# Patient Record
Sex: MALE | Race: OTHER | HISPANIC OR LATINO | URBAN - METROPOLITAN AREA
[De-identification: names, ages, dates, MRNs, and addresses within clinical notes are randomized per-mention and may not be internally consistent; named-entity substitution may affect disease eponyms.]

---

## 2019-07-10 ENCOUNTER — OUTPATIENT (OUTPATIENT)
Dept: OUTPATIENT SERVICES | Facility: HOSPITAL | Age: 59
LOS: 1 days | Discharge: HOME | End: 2019-07-10
Payer: MEDICAID

## 2019-07-10 DIAGNOSIS — M25.562 PAIN IN LEFT KNEE: ICD-10-CM

## 2019-07-10 PROCEDURE — 73562 X-RAY EXAM OF KNEE 3: CPT | Mod: 26,LT

## 2020-02-13 PROBLEM — Z00.00 ENCOUNTER FOR PREVENTIVE HEALTH EXAMINATION: Status: ACTIVE | Noted: 2020-02-13

## 2020-02-20 ENCOUNTER — APPOINTMENT (OUTPATIENT)
Dept: UROLOGY | Facility: CLINIC | Age: 60
End: 2020-02-20
Payer: MEDICAID

## 2020-02-20 DIAGNOSIS — N13.30 UNSPECIFIED HYDRONEPHROSIS: ICD-10-CM

## 2020-02-20 PROCEDURE — 99202 OFFICE O/P NEW SF 15 MIN: CPT

## 2020-02-20 NOTE — ASSESSMENT
[FreeTextEntry1] : 60 yo with bladder mass / cancer\par hydronephrosis\par \par - CT urogram\par - cystoscopy / TURBT\par \par full R+B explained\par all options provided\par

## 2020-02-20 NOTE — HISTORY OF PRESENT ILLNESS
[None] : no symptoms [FreeTextEntry1] : 60 yo with newly diagnosed bladder cancer\par \par s/p left nephrectomy for non-functional kidney \par \par found to have a bladder tumor on office US\par

## 2020-02-20 NOTE — LETTER BODY
[Dear  ___] : Dear  [unfilled], [Consult Letter:] : I had the pleasure of evaluating your patient, [unfilled]. [Please see my note below.] : Please see my note below. [Sincerely,] : Sincerely, [FreeTextEntry3] : Chet Diaz MD, FACS\par

## 2020-02-27 ENCOUNTER — OUTPATIENT (OUTPATIENT)
Dept: OUTPATIENT SERVICES | Facility: HOSPITAL | Age: 60
LOS: 1 days | Discharge: HOME | End: 2020-02-27

## 2020-02-27 DIAGNOSIS — C67.9 MALIGNANT NEOPLASM OF BLADDER, UNSPECIFIED: ICD-10-CM

## 2020-02-27 DIAGNOSIS — N13.30 UNSPECIFIED HYDRONEPHROSIS: ICD-10-CM

## 2020-02-28 ENCOUNTER — OUTPATIENT (OUTPATIENT)
Dept: OUTPATIENT SERVICES | Facility: HOSPITAL | Age: 60
LOS: 1 days | Discharge: HOME | End: 2020-02-28
Payer: COMMERCIAL

## 2020-02-28 DIAGNOSIS — N13.30 UNSPECIFIED HYDRONEPHROSIS: ICD-10-CM

## 2020-02-28 PROCEDURE — 74178 CT ABD&PLV WO CNTR FLWD CNTR: CPT | Mod: 26

## 2020-03-05 ENCOUNTER — APPOINTMENT (OUTPATIENT)
Dept: UROLOGY | Facility: CLINIC | Age: 60
End: 2020-03-05
Payer: MEDICAID

## 2020-03-05 DIAGNOSIS — N13.30 UNSPECIFIED HYDRONEPHROSIS: ICD-10-CM

## 2020-03-05 PROCEDURE — 99214 OFFICE O/P EST MOD 30 MIN: CPT

## 2020-03-06 PROBLEM — N13.30 HYDRONEPHROSIS: Status: ACTIVE | Noted: 2020-03-06

## 2020-03-06 NOTE — ASSESSMENT
[Hydronephrosis (591\N13.30)] : Salter-Braden type I [FreeTextEntry1] : 60 yo with solitary right kidney\par question of hydronephrosis with transition at proximal ureter - no filling defect\par small bladder lesion\par no evidence of extravesical extension \par \par I discussed the surgery in detail\par I explained the role of post resection Mitomycin C\par I explained the possibility that he may require post operative catheterization\par due  either to concern for retention or if the resection was performed into the deeper segment of the bladder to prevent perforation\par all questions answered\par

## 2020-03-06 NOTE — HISTORY OF PRESENT ILLNESS
[None] : no symptoms [FreeTextEntry1] : 60 yo with newly diagnosed bladder cancer\par \par s/p left nephrectomy for non-functional kidney - [facility]\par \par found to have a bladder tumor on office US here to review CT scan and discuss surgery\par \par CT scan 2/28/20\par IMPRESSION: \par 1. Irregular lobulated intraluminal soft tissue mass arising from the left \par posteriolateral urinary bladder wall measuring up to 1.2 cm. Recommend \par correlation with cystoscopy. \par 2. Mild-to-moderate right hydronephrosis without obstructing  tract \par calculus or mass lesion. Abrupt change in caliber within the proximal \par portion of the right ureter suspicious for possible stricture. \par 3. Left nephrectomy. \par \par \par

## 2020-03-09 ENCOUNTER — OUTPATIENT (OUTPATIENT)
Dept: OUTPATIENT SERVICES | Facility: HOSPITAL | Age: 60
LOS: 1 days | Discharge: HOME | End: 2020-03-09
Payer: MEDICAID

## 2020-03-09 VITALS
HEART RATE: 76 BPM | TEMPERATURE: 97 F | HEIGHT: 63 IN | SYSTOLIC BLOOD PRESSURE: 118 MMHG | WEIGHT: 149.91 LBS | OXYGEN SATURATION: 99 % | RESPIRATION RATE: 16 BRPM | DIASTOLIC BLOOD PRESSURE: 68 MMHG

## 2020-03-09 DIAGNOSIS — Z90.5 ACQUIRED ABSENCE OF KIDNEY: Chronic | ICD-10-CM

## 2020-03-09 DIAGNOSIS — Z01.818 ENCOUNTER FOR OTHER PREPROCEDURAL EXAMINATION: ICD-10-CM

## 2020-03-09 DIAGNOSIS — C67.9 MALIGNANT NEOPLASM OF BLADDER, UNSPECIFIED: ICD-10-CM

## 2020-03-09 DIAGNOSIS — I26.99 OTHER PULMONARY EMBOLISM WITHOUT ACUTE COR PULMONALE: ICD-10-CM

## 2020-03-09 LAB
ALBUMIN SERPL ELPH-MCNC: 4.3 G/DL — SIGNIFICANT CHANGE UP (ref 3.5–5.2)
ALP SERPL-CCNC: 69 U/L — SIGNIFICANT CHANGE UP (ref 30–115)
ALT FLD-CCNC: 15 U/L — SIGNIFICANT CHANGE UP (ref 0–41)
ANION GAP SERPL CALC-SCNC: 11 MMOL/L — SIGNIFICANT CHANGE UP (ref 7–14)
APPEARANCE UR: CLEAR — SIGNIFICANT CHANGE UP
APTT BLD: 34.3 SEC — SIGNIFICANT CHANGE UP (ref 27–39.2)
AST SERPL-CCNC: 23 U/L — SIGNIFICANT CHANGE UP (ref 0–41)
BASOPHILS # BLD AUTO: 0.01 K/UL — SIGNIFICANT CHANGE UP (ref 0–0.2)
BASOPHILS NFR BLD AUTO: 0.2 % — SIGNIFICANT CHANGE UP (ref 0–1)
BILIRUB SERPL-MCNC: 0.5 MG/DL — SIGNIFICANT CHANGE UP (ref 0.2–1.2)
BILIRUB UR-MCNC: NEGATIVE — SIGNIFICANT CHANGE UP
BUN SERPL-MCNC: 13 MG/DL — SIGNIFICANT CHANGE UP (ref 10–20)
CALCIUM SERPL-MCNC: 9 MG/DL — SIGNIFICANT CHANGE UP (ref 8.5–10.1)
CHLORIDE SERPL-SCNC: 103 MMOL/L — SIGNIFICANT CHANGE UP (ref 98–110)
CO2 SERPL-SCNC: 26 MMOL/L — SIGNIFICANT CHANGE UP (ref 17–32)
COLOR SPEC: SIGNIFICANT CHANGE UP
CREAT SERPL-MCNC: 1 MG/DL — SIGNIFICANT CHANGE UP (ref 0.7–1.5)
DIFF PNL FLD: NEGATIVE — SIGNIFICANT CHANGE UP
EOSINOPHIL # BLD AUTO: 0.15 K/UL — SIGNIFICANT CHANGE UP (ref 0–0.7)
EOSINOPHIL NFR BLD AUTO: 2.6 % — SIGNIFICANT CHANGE UP (ref 0–8)
GLUCOSE SERPL-MCNC: 80 MG/DL — SIGNIFICANT CHANGE UP (ref 70–99)
GLUCOSE UR QL: NEGATIVE — SIGNIFICANT CHANGE UP
HCT VFR BLD CALC: 45.3 % — SIGNIFICANT CHANGE UP (ref 42–52)
HGB BLD-MCNC: 15.4 G/DL — SIGNIFICANT CHANGE UP (ref 14–18)
IMM GRANULOCYTES NFR BLD AUTO: 0.3 % — SIGNIFICANT CHANGE UP (ref 0.1–0.3)
INR BLD: 0.96 RATIO — SIGNIFICANT CHANGE UP (ref 0.65–1.3)
KETONES UR-MCNC: NEGATIVE — SIGNIFICANT CHANGE UP
LEUKOCYTE ESTERASE UR-ACNC: NEGATIVE — SIGNIFICANT CHANGE UP
LYMPHOCYTES # BLD AUTO: 1.49 K/UL — SIGNIFICANT CHANGE UP (ref 1.2–3.4)
LYMPHOCYTES # BLD AUTO: 25.5 % — SIGNIFICANT CHANGE UP (ref 20.5–51.1)
MCHC RBC-ENTMCNC: 30.3 PG — SIGNIFICANT CHANGE UP (ref 27–31)
MCHC RBC-ENTMCNC: 34 G/DL — SIGNIFICANT CHANGE UP (ref 32–37)
MCV RBC AUTO: 89 FL — SIGNIFICANT CHANGE UP (ref 80–94)
MONOCYTES # BLD AUTO: 0.55 K/UL — SIGNIFICANT CHANGE UP (ref 0.1–0.6)
MONOCYTES NFR BLD AUTO: 9.4 % — HIGH (ref 1.7–9.3)
NEUTROPHILS # BLD AUTO: 3.63 K/UL — SIGNIFICANT CHANGE UP (ref 1.4–6.5)
NEUTROPHILS NFR BLD AUTO: 62 % — SIGNIFICANT CHANGE UP (ref 42.2–75.2)
NITRITE UR-MCNC: NEGATIVE — SIGNIFICANT CHANGE UP
NRBC # BLD: 0 /100 WBCS — SIGNIFICANT CHANGE UP (ref 0–0)
PH UR: 6.5 — SIGNIFICANT CHANGE UP (ref 5–8)
PLATELET # BLD AUTO: 238 K/UL — SIGNIFICANT CHANGE UP (ref 130–400)
POTASSIUM SERPL-MCNC: 4 MMOL/L — SIGNIFICANT CHANGE UP (ref 3.5–5)
POTASSIUM SERPL-SCNC: 4 MMOL/L — SIGNIFICANT CHANGE UP (ref 3.5–5)
PROT SERPL-MCNC: 7 G/DL — SIGNIFICANT CHANGE UP (ref 6–8)
PROT UR-MCNC: NEGATIVE — SIGNIFICANT CHANGE UP
PROTHROM AB SERPL-ACNC: 11 SEC — SIGNIFICANT CHANGE UP (ref 9.95–12.87)
RBC # BLD: 5.09 M/UL — SIGNIFICANT CHANGE UP (ref 4.7–6.1)
RBC # FLD: 12.3 % — SIGNIFICANT CHANGE UP (ref 11.5–14.5)
SODIUM SERPL-SCNC: 140 MMOL/L — SIGNIFICANT CHANGE UP (ref 135–146)
SP GR SPEC: 1.01 — SIGNIFICANT CHANGE UP (ref 1.01–1.02)
UROBILINOGEN FLD QL: SIGNIFICANT CHANGE UP
WBC # BLD: 5.85 K/UL — SIGNIFICANT CHANGE UP (ref 4.8–10.8)
WBC # FLD AUTO: 5.85 K/UL — SIGNIFICANT CHANGE UP (ref 4.8–10.8)

## 2020-03-09 PROCEDURE — 71046 X-RAY EXAM CHEST 2 VIEWS: CPT | Mod: 26

## 2020-03-09 PROCEDURE — 93010 ELECTROCARDIOGRAM REPORT: CPT

## 2020-03-09 NOTE — H&P PST ADULT - REASON FOR ADMISSION
59 yr old man to past for cystoscopy bladder tumor resection sent by pmd to urology noted mass bladder per pt new dx no fever no recent travel no uti uri cp palp sob pain at this time exercise tolerance 2 flights liv[place] screen revd

## 2020-03-10 LAB
CULTURE RESULTS: SIGNIFICANT CHANGE UP
SPECIMEN SOURCE: SIGNIFICANT CHANGE UP

## 2020-03-25 ENCOUNTER — APPOINTMENT (OUTPATIENT)
Dept: UROLOGY | Facility: HOSPITAL | Age: 60
End: 2020-03-25

## 2020-04-03 PROBLEM — M19.90 UNSPECIFIED OSTEOARTHRITIS, UNSPECIFIED SITE: Chronic | Status: ACTIVE | Noted: 2020-03-09

## 2020-04-03 PROBLEM — D49.4 NEOPLASM OF UNSPECIFIED BEHAVIOR OF BLADDER: Chronic | Status: ACTIVE | Noted: 2020-03-09

## 2020-04-14 ENCOUNTER — OUTPATIENT (OUTPATIENT)
Dept: OUTPATIENT SERVICES | Facility: HOSPITAL | Age: 60
LOS: 1 days | Discharge: HOME | End: 2020-04-14
Payer: MEDICAID

## 2020-04-14 ENCOUNTER — INPATIENT (INPATIENT)
Facility: HOSPITAL | Age: 60
LOS: 2 days | Discharge: HOME | End: 2020-04-17
Attending: INTERNAL MEDICINE | Admitting: INTERNAL MEDICINE
Payer: COMMERCIAL

## 2020-04-14 VITALS
SYSTOLIC BLOOD PRESSURE: 91 MMHG | TEMPERATURE: 98 F | HEART RATE: 103 BPM | OXYGEN SATURATION: 90 % | RESPIRATION RATE: 26 BRPM | DIASTOLIC BLOOD PRESSURE: 60 MMHG

## 2020-04-14 DIAGNOSIS — Z90.5 ACQUIRED ABSENCE OF KIDNEY: Chronic | ICD-10-CM

## 2020-04-14 DIAGNOSIS — A41.89 OTHER SPECIFIED SEPSIS: ICD-10-CM

## 2020-04-14 DIAGNOSIS — R06.02 SHORTNESS OF BREATH: ICD-10-CM

## 2020-04-14 LAB
ALBUMIN SERPL ELPH-MCNC: 3 G/DL — LOW (ref 3.5–5.2)
ALP SERPL-CCNC: 66 U/L — SIGNIFICANT CHANGE UP (ref 30–115)
ALT FLD-CCNC: 51 U/L — HIGH (ref 0–41)
ANION GAP SERPL CALC-SCNC: 12 MMOL/L — SIGNIFICANT CHANGE UP (ref 7–14)
AST SERPL-CCNC: 44 U/L — HIGH (ref 0–41)
BASOPHILS # BLD AUTO: 0.02 K/UL — SIGNIFICANT CHANGE UP (ref 0–0.2)
BASOPHILS NFR BLD AUTO: 0.3 % — SIGNIFICANT CHANGE UP (ref 0–1)
BILIRUB SERPL-MCNC: 0.6 MG/DL — SIGNIFICANT CHANGE UP (ref 0.2–1.2)
BUN SERPL-MCNC: 12 MG/DL — SIGNIFICANT CHANGE UP (ref 10–20)
CALCIUM SERPL-MCNC: 8.5 MG/DL — SIGNIFICANT CHANGE UP (ref 8.5–10.1)
CHLORIDE SERPL-SCNC: 97 MMOL/L — LOW (ref 98–110)
CO2 SERPL-SCNC: 23 MMOL/L — SIGNIFICANT CHANGE UP (ref 17–32)
CREAT SERPL-MCNC: 0.7 MG/DL — SIGNIFICANT CHANGE UP (ref 0.7–1.5)
D DIMER BLD IA.RAPID-MCNC: 303 NG/ML DDU — HIGH (ref 0–230)
EOSINOPHIL # BLD AUTO: 0.04 K/UL — SIGNIFICANT CHANGE UP (ref 0–0.7)
EOSINOPHIL NFR BLD AUTO: 0.5 % — SIGNIFICANT CHANGE UP (ref 0–8)
GLUCOSE SERPL-MCNC: 127 MG/DL — HIGH (ref 70–99)
HCT VFR BLD CALC: 38.2 % — LOW (ref 42–52)
HGB BLD-MCNC: 14.1 G/DL — SIGNIFICANT CHANGE UP (ref 14–18)
IMM GRANULOCYTES NFR BLD AUTO: 1.6 % — HIGH (ref 0.1–0.3)
LYMPHOCYTES # BLD AUTO: 0.77 K/UL — LOW (ref 1.2–3.4)
LYMPHOCYTES # BLD AUTO: 10.4 % — LOW (ref 20.5–51.1)
MCHC RBC-ENTMCNC: 31.7 PG — HIGH (ref 27–31)
MCHC RBC-ENTMCNC: 36.9 G/DL — SIGNIFICANT CHANGE UP (ref 32–37)
MCV RBC AUTO: 85.8 FL — SIGNIFICANT CHANGE UP (ref 80–94)
MONOCYTES # BLD AUTO: 0.92 K/UL — HIGH (ref 0.1–0.6)
MONOCYTES NFR BLD AUTO: 12.4 % — HIGH (ref 1.7–9.3)
NEUTROPHILS # BLD AUTO: 5.52 K/UL — SIGNIFICANT CHANGE UP (ref 1.4–6.5)
NEUTROPHILS NFR BLD AUTO: 74.8 % — SIGNIFICANT CHANGE UP (ref 42.2–75.2)
NRBC # BLD: 0 /100 WBCS — SIGNIFICANT CHANGE UP (ref 0–0)
PLATELET # BLD AUTO: 410 K/UL — HIGH (ref 130–400)
POTASSIUM SERPL-MCNC: 3.9 MMOL/L — SIGNIFICANT CHANGE UP (ref 3.5–5)
POTASSIUM SERPL-SCNC: 3.9 MMOL/L — SIGNIFICANT CHANGE UP (ref 3.5–5)
PROT SERPL-MCNC: 6.2 G/DL — SIGNIFICANT CHANGE UP (ref 6–8)
RBC # BLD: 4.45 M/UL — LOW (ref 4.7–6.1)
RBC # FLD: 12.3 % — SIGNIFICANT CHANGE UP (ref 11.5–14.5)
SODIUM SERPL-SCNC: 132 MMOL/L — LOW (ref 135–146)
WBC # BLD: 7.39 K/UL — SIGNIFICANT CHANGE UP (ref 4.8–10.8)
WBC # FLD AUTO: 7.39 K/UL — SIGNIFICANT CHANGE UP (ref 4.8–10.8)

## 2020-04-14 PROCEDURE — 99285 EMERGENCY DEPT VISIT HI MDM: CPT

## 2020-04-14 PROCEDURE — 71046 X-RAY EXAM CHEST 2 VIEWS: CPT | Mod: 26

## 2020-04-14 PROCEDURE — 93010 ELECTROCARDIOGRAM REPORT: CPT

## 2020-04-14 RX ORDER — CIPROFLOXACIN LACTATE 400MG/40ML
1 VIAL (ML) INTRAVENOUS
Qty: 0 | Refills: 0 | DISCHARGE

## 2020-04-14 RX ORDER — SODIUM CHLORIDE 9 MG/ML
1000 INJECTION, SOLUTION INTRAVENOUS ONCE
Refills: 0 | Status: COMPLETED | OUTPATIENT
Start: 2020-04-14 | End: 2020-04-14

## 2020-04-14 RX ORDER — ZINC SULFATE TAB 220 MG (50 MG ZINC EQUIVALENT) 220 (50 ZN) MG
220 TAB ORAL DAILY
Refills: 0 | Status: DISCONTINUED | OUTPATIENT
Start: 2020-04-14 | End: 2020-04-17

## 2020-04-14 RX ORDER — ENOXAPARIN SODIUM 100 MG/ML
40 INJECTION SUBCUTANEOUS AT BEDTIME
Refills: 0 | Status: DISCONTINUED | OUTPATIENT
Start: 2020-04-14 | End: 2020-04-17

## 2020-04-14 RX ORDER — ALBUTEROL 90 UG/1
2 AEROSOL, METERED ORAL EVERY 6 HOURS
Refills: 0 | Status: DISCONTINUED | OUTPATIENT
Start: 2020-04-14 | End: 2020-04-17

## 2020-04-14 RX ORDER — HYDROXYCHLOROQUINE SULFATE 200 MG
800 TABLET ORAL EVERY 24 HOURS
Refills: 0 | Status: COMPLETED | OUTPATIENT
Start: 2020-04-14 | End: 2020-04-14

## 2020-04-14 RX ORDER — HYDROXYCHLOROQUINE SULFATE 200 MG
400 TABLET ORAL EVERY 24 HOURS
Refills: 0 | Status: DISCONTINUED | OUTPATIENT
Start: 2020-04-15 | End: 2020-04-17

## 2020-04-14 RX ORDER — SODIUM CHLORIDE 9 MG/ML
1000 INJECTION INTRAMUSCULAR; INTRAVENOUS; SUBCUTANEOUS
Refills: 0 | Status: DISCONTINUED | OUTPATIENT
Start: 2020-04-14 | End: 2020-04-14

## 2020-04-14 RX ORDER — HYDROXYCHLOROQUINE SULFATE 200 MG
TABLET ORAL
Refills: 0 | Status: DISCONTINUED | OUTPATIENT
Start: 2020-04-14 | End: 2020-04-17

## 2020-04-14 RX ORDER — GUAIFENESIN/DEXTROMETHORPHAN 600MG-30MG
5 TABLET, EXTENDED RELEASE 12 HR ORAL EVERY 6 HOURS
Refills: 0 | Status: DISCONTINUED | OUTPATIENT
Start: 2020-04-14 | End: 2020-04-17

## 2020-04-14 RX ORDER — ASCORBIC ACID 60 MG
1000 TABLET,CHEWABLE ORAL DAILY
Refills: 0 | Status: DISCONTINUED | OUTPATIENT
Start: 2020-04-14 | End: 2020-04-17

## 2020-04-14 RX ORDER — SODIUM CHLORIDE 9 MG/ML
1000 INJECTION INTRAMUSCULAR; INTRAVENOUS; SUBCUTANEOUS
Refills: 0 | Status: DISCONTINUED | OUTPATIENT
Start: 2020-04-14 | End: 2020-04-15

## 2020-04-14 RX ADMIN — Medication 800 MILLIGRAM(S): at 18:04

## 2020-04-14 RX ADMIN — SODIUM CHLORIDE 1000 MILLILITER(S): 9 INJECTION, SOLUTION INTRAVENOUS at 14:42

## 2020-04-14 RX ADMIN — Medication 1000 MILLIGRAM(S): at 19:07

## 2020-04-14 RX ADMIN — SODIUM CHLORIDE 75 MILLILITER(S): 9 INJECTION INTRAMUSCULAR; INTRAVENOUS; SUBCUTANEOUS at 18:04

## 2020-04-14 RX ADMIN — ZINC SULFATE TAB 220 MG (50 MG ZINC EQUIVALENT) 220 MILLIGRAM(S): 220 (50 ZN) TAB at 19:07

## 2020-04-14 RX ADMIN — ENOXAPARIN SODIUM 40 MILLIGRAM(S): 100 INJECTION SUBCUTANEOUS at 21:38

## 2020-04-14 NOTE — ED ADULT TRIAGE NOTE - CHIEF COMPLAINT QUOTE
sent to ED by PMD for abnormal chest x-ray results  "there is fluid on my lungs"   denies fever at home, states he has been on antibiotics

## 2020-04-14 NOTE — ED PROVIDER NOTE - ATTENDING CONTRIBUTION TO CARE
59M PMH L nephrectomy 2003 and bladder tumor not on treatment, plans for resection, p/w 2 weeks of dry cough and sore throat since 3/20. seen by pmd and given oral antiboitics which didn't help, hasn't completed yet. states today had CXR and told he has fluid in lungs so told to come to ED. states he has had grad worsening SOB since 4/1, worse w exertion. no cp. no recent travel, sick contacts.     on exam, AFVSS, well barbara nad, ncat, eomi, perrla, mmm, lctab, rrr nl s1s2 no mrg, abd soft ntnd, aaox3, no focal deficits, no le edema or calf ttp,     RA sat at rest is 93-94%, with ambulation in room 90% RA, no resp distress  cxr reviewed from today outpt with bilat patch interstitial opacities    a/p; cocern for covid w mild hypoxia, will do labs, ekg, admit for o2/monitoring

## 2020-04-14 NOTE — ED PROVIDER NOTE - OBJECTIVE STATEMENT
59 y/m with PMH of Left nephrectomy (for non functioning Kidney), Recently diagnosed bladder tumor (being planned for resection) presented with cough for 2 weeks and shortness of breath for 1 day. He initially had sore throat around 2 weeks back, and cough with minimal phlegm, for which he went to see his PMH who prescribed him with antibiotics Ciprofloxacin. However since yesterday he started having difficulty breathing. So he called his PMD, who did a CXR , and was recommended to go to ED "for fluid in lungs". He denies having any diarrhea, has increased urinary frequency since last 2 weeks. He denies any chest pain, fever. He denies any sick contacts or travel history.

## 2020-04-14 NOTE — H&P ADULT - ASSESSMENT
Patient is 60 y/o male with medical hx of Osteoarthritis, Recent diagnosis of bladder tumor (being planned for resection) presented from home with CC of URI symptoms for 2 weeks and New onset Dyspnea since yesterday    Viral Pneumonia, Multifocal  R/O COVID-19  -Mild sepsis on Presentation: hypotension and Tachycardia  -CXR: Bilateral/multifocal Patchy airspace opacity  -Saturating 94% ar rest >>> 90% with Ambulation   -Labs significant for Lymphopenia and Mild Transaminitis  -Denies Sick/Known covid Contacts ???  -Will Start Plaquenil given Hypoxia and CXR changes  -EP consult placed for QTC monitoring, Baseline QTC: 3/9: 395  -Supportive care with IVF, Vitamin C and Zinc   - f/u COVID19 swab, procalcitonin, CRP, ferritin,   - Tylenol PRN for fevers, Trend CBC and LFTs.  -  ID consult if no improvement     Bladder Tumor  -Recently diagnosed on CT Scan  from Feb 20  -Scheduled for Resection   -O/P F/up with Dr Diaz    H/O Osteoarthritis      DVT PPX: Lovenox  GI PPX: not indicated   Full Code  Dispo: from Home  -Pending Clinical improvement (hypoxia) Patient is 58 y/o male with medical hx of Osteoarthritis, Recent diagnosis of bladder tumor (being planned for resection) presented from home with CC of URI symptoms for 2 weeks and New onset Dyspnea since yesterday    Viral Pneumonia, Multifocal  R/O COVID-19  -Mild sepsis on Presentation: hypotension and Tachycardia  -CXR: Bilateral/multifocal Patchy airspace opacity  -Saturating 94% ar rest >>> 90% with Ambulation   -Labs significant for Lymphopenia and Mild Transaminitis  -Denies Sick/Known covid Contacts ???  -Will Start Plaquenil given Hypoxia and CXR changes  -EP consult placed for QTC monitoring, Baseline QTC: 4/14: 428  -Supportive care with IVF, Vitamin C and Zinc   - f/u COVID19 swab, procalcitonin, CRP, ferritin,   - Tylenol PRN for fevers, Trend CBC and LFTs.  -  ID consult if no improvement     Bladder Tumor  -Recently diagnosed on CT Scan  from Feb 20  -Scheduled for Resection   -O/P F/up with Dr Diaz    H/O Osteoarthritis      DVT PPX: Lovenox  GI PPX: not indicated   Full Code  Dispo: from Home  -Pending Clinical improvement (hypoxia) Patient is 60 y/o male with medical hx of Osteoarthritis, Recent diagnosis of bladder tumor (being planned for resection) presented from home with CC of URI symptoms for 2 weeks and New onset Dyspnea since yesterday    Viral Pneumonia, Multifocal  R/O COVID-19  -Mild sepsis on Presentation: hypotension and Tachycardia  -CXR: Bilateral/multifocal Patchy airspace opacity  -Saturating 94% ar rest >>> 90% with Ambulation   -Labs significant for Lymphopenia and Mild Transaminitis  -Denies Sick/Known covid Contacts ???  -Will Start Plaquenil given Hypoxia and CXR changes  -EP consult placed for QTC monitoring, Baseline QTC: 4/14: 428  -Supportive care with IVF, Vitamin C and Zinc   - f/u COVID19 swab, procalcitonin, CRP, ferritin,   - Tylenol PRN for fevers, Trend CBC and LFTs.  -  ID consult if no improvement     Bladder Tumor  -Recently diagnosed on CT Scan  from Feb 20  -Scheduled for Resection   -O/P F/up with Dr Diaz    H/O Osteoarthritis  -Not on Meds       DVT PPX: Lovenox  GI PPX: not indicated   Full Code  Dispo: from Home  -Pending Clinical improvement (hypoxia)

## 2020-04-14 NOTE — H&P ADULT - NSHPLABSRESULTS_GEN_ALL_CORE
LABS:                          14.1   7.39  )-----------( 410      ( 14 Apr 2020 13:55 )             38.2     04-14    132<L>  |  97<L>  |  12  ----------------------------<  127<H>  3.9   |  23  |  0.7    Ca    8.5      14 Apr 2020 13:55    TPro  6.2  /  Alb  3.0<L>  /  TBili  0.6  /  DBili  x   /  AST  44<H>  /  ALT  51<H>  /  AlkPhos  66  04-14          RADIOLOGY:      < from: Xray Chest 2 Views PA/Lat (04.14.20 @ 09:09) >    Impression:     New patchy bilateral airspace opacities, compatible with multifocal pneumonia -considerations include atypical viral etiology such as COVID-19. Radiographic follow-up recommended.    Spoke with GREGORY SMITH MD   on 4/14/2020 9:27 AM with readback.    EKGS:

## 2020-04-14 NOTE — ED PROVIDER NOTE - PROGRESS NOTE DETAILS
CXR done this AM shows Bilateral patchy opacity likely viral infection, COVID swab sent.  CBC, CMP, ferritin, D dimer, procal, crp sent  Will reassess vitals and further decision. Currently he is saturating 93-94% on room air but desaturates to 90% on minimal exertion.  His BP is borderline 91/60, will give him a bolus of 1 liter. Currently he is saturating 93-94% on room air but desaturates to 90% on minimal exertion.  His BP is borderline 91/60, will give him a bolus of 1 liter. Repeat BP after bolus is 140/90. Will admit for hypoxia

## 2020-04-14 NOTE — H&P ADULT - NSHPREVIEWOFSYSTEMS_GEN_ALL_CORE
CONSTITUTIONAL: No weakness, fevers or chills    RESPIRATORY: Endorses  cough and  shortness of breath    CARDIOVASCULAR: No chest pain or palpitations    GASTROINTESTINAL: No abdominal pain. No nausea, vomiting, No diarrhea    GENITOURINARY: Endorses frequency, No dysuria or hematuria    NEUROLOGICAL: No numbness or weakness

## 2020-04-14 NOTE — ED ADULT NURSE NOTE - OBJECTIVE STATEMENT
pt states has sob for 2 weeks . pt states "my pmd told me to come here because of my chest x ray" " I had fever once 2 weeks ago"

## 2020-04-14 NOTE — ED PROVIDER NOTE - NS ED ROS FT
REVIEW OF SYSTEMS:    CONSTITUTIONAL: Generalized weakness  EYES/ENT: No visual changes;  No vertigo or throat pain   NECK: No pain or stiffness  RESPIRATORY: Cough +, Shortness of breath +  CARDIOVASCULAR: No chest pain or palpitations  GASTROINTESTINAL: No abdominal or epigastric pain. No nausea, vomiting, or hematemesis; No diarrhea or constipation. No melena or hematochezia.  GENITOURINARY: Increased frequency  NEUROLOGICAL: No numbness or weakness  SKIN: No itching, rashes

## 2020-04-14 NOTE — ED PROVIDER NOTE - PHYSICAL EXAMINATION
T(C): 36.5 (04-14-20 @ 12:54), Max: 36.5 (04-14-20 @ 12:54)  HR: 103 (04-14-20 @ 12:54) (103 - 103)  BP: 91/60 (04-14-20 @ 12:54) (91/60 - 91/60)  RR: 26 (04-14-20 @ 12:54) (26 - 26)  SpO2: 90% (04-14-20 @ 12:54) (90% - 90%)  PHYSICAL EXAM:    Constitutional: Lying in bed no acute distress  Eyes: PERLAA  ENMT: No facial deviation  Neck: No mass/ JVD  Respiratory: B/l clear, no crackles, wheeze  Cardiovascular: Regular rate and rhythm, no murmur  Gastointestinal: soft non tender, no organomegaly  Extremities: No edema, B/l lower ext  Neurological: AO x 3, non focal

## 2020-04-14 NOTE — H&P ADULT - HISTORY OF PRESENT ILLNESS
Patient is 60 y/o male with medical hx of Osteoarthritis, Recent diagnosis of bladder tumor (being planned for resection) presented from home with CC of URI symptoms for 2 weeks and New onset Dyspnea since yesterday.  Symptoms initially started 2 weeks with sore throat and nonproductive cough, his PMD prescribed a course of Ciprofloxacin which he completed. However, since yesterday he started having difficulty breathing, So he  went to his PMD, CXR was done and he was  to go to ED for evaluation of " fluid in his lungs".     He denies CP, Palpitation Headaches, dizziness, Abdominal pain, N/V/D, diarrhea, Leg pain/swelling, no recent travel, no known sick or COVID contacts, he endorses a 2 week hx of urinary frequency, no foul smelling urine, no dysuria no hematuria    IN ED: Saturating 94% on RA with desaturation to 90% with Mild Exertion   Vitals: Borderline BP: 901/60, HR: 103, RR: 26  Labs Significant for Lymphopenia and Mild Transaminitis  CXR: Bilateral/multifocal  Patchy airspace opacity

## 2020-04-14 NOTE — ED ADULT NURSE NOTE - NSIMPLEMENTINTERV_GEN_ALL_ED
Implemented All Universal Safety Interventions:  [place] to call system. Call bell, personal items and telephone within reach. Instruct patient to call for assistance. Room bathroom lighting operational. Non-slip footwear when patient is off stretcher. Physically safe environment: no spills, clutter or unnecessary equipment. Stretcher in lowest position, wheels locked, appropriate side rails in place.

## 2020-04-14 NOTE — H&P ADULT - ATTENDING COMMENTS
Patient seen and examined independently. Agree with resident note with exceptions. Case discussed with housestaff, nursing and patient    Procalcitonin, Serum: 0.09 (04-14)    C-Reactive Protein, Serum: 19.98 (04-14)    Ferritin, Serum: 1216 (04-14)    D-Dimer Assay, Quantitative: 303 (04-14)    A/P  Suspected COVID PNA  - Pt clinically doing well but inflam markers markedly elevated, concerning for cytokine storm risk    Bladder mass  -outpt f/u

## 2020-04-15 DIAGNOSIS — R68.89 OTHER GENERAL SYMPTOMS AND SIGNS: ICD-10-CM

## 2020-04-15 DIAGNOSIS — Z02.9 ENCOUNTER FOR ADMINISTRATIVE EXAMINATIONS, UNSPECIFIED: ICD-10-CM

## 2020-04-15 DIAGNOSIS — R09.02 HYPOXEMIA: ICD-10-CM

## 2020-04-15 LAB
ALBUMIN SERPL ELPH-MCNC: 2.8 G/DL — LOW (ref 3.5–5.2)
ALP SERPL-CCNC: 68 U/L — SIGNIFICANT CHANGE UP (ref 30–115)
ALT FLD-CCNC: 41 U/L — SIGNIFICANT CHANGE UP (ref 0–41)
ANION GAP SERPL CALC-SCNC: 14 MMOL/L — SIGNIFICANT CHANGE UP (ref 7–14)
AST SERPL-CCNC: 34 U/L — SIGNIFICANT CHANGE UP (ref 0–41)
BASOPHILS # BLD AUTO: 0.02 K/UL — SIGNIFICANT CHANGE UP (ref 0–0.2)
BASOPHILS NFR BLD AUTO: 0.3 % — SIGNIFICANT CHANGE UP (ref 0–1)
BILIRUB SERPL-MCNC: 0.9 MG/DL — SIGNIFICANT CHANGE UP (ref 0.2–1.2)
BUN SERPL-MCNC: 9 MG/DL — LOW (ref 10–20)
CALCIUM SERPL-MCNC: 8.4 MG/DL — LOW (ref 8.5–10.1)
CHLORIDE SERPL-SCNC: 100 MMOL/L — SIGNIFICANT CHANGE UP (ref 98–110)
CHOLEST SERPL-MCNC: 109 MG/DL — SIGNIFICANT CHANGE UP (ref 100–200)
CO2 SERPL-SCNC: 23 MMOL/L — SIGNIFICANT CHANGE UP (ref 17–32)
CREAT SERPL-MCNC: 0.8 MG/DL — SIGNIFICANT CHANGE UP (ref 0.7–1.5)
CRP SERPL-MCNC: 19.98 MG/DL — HIGH (ref 0–0.4)
EOSINOPHIL # BLD AUTO: 0.08 K/UL — SIGNIFICANT CHANGE UP (ref 0–0.7)
EOSINOPHIL NFR BLD AUTO: 1.2 % — SIGNIFICANT CHANGE UP (ref 0–8)
FERRITIN SERPL-MCNC: 1216 NG/ML — HIGH (ref 30–400)
GLUCOSE SERPL-MCNC: 115 MG/DL — HIGH (ref 70–99)
HCT VFR BLD CALC: 37.8 % — LOW (ref 42–52)
HCV AB S/CO SERPL IA: 0.03 COI — SIGNIFICANT CHANGE UP
HCV AB SERPL-IMP: SIGNIFICANT CHANGE UP
HDLC SERPL-MCNC: 39 MG/DL — LOW
HGB BLD-MCNC: 13.6 G/DL — LOW (ref 14–18)
IMM GRANULOCYTES NFR BLD AUTO: 2.2 % — HIGH (ref 0.1–0.3)
LIPID PNL WITH DIRECT LDL SERPL: 52 MG/DL — SIGNIFICANT CHANGE UP (ref 4–129)
LYMPHOCYTES # BLD AUTO: 0.75 K/UL — LOW (ref 1.2–3.4)
LYMPHOCYTES # BLD AUTO: 10.9 % — LOW (ref 20.5–51.1)
MCHC RBC-ENTMCNC: 30.8 PG — SIGNIFICANT CHANGE UP (ref 27–31)
MCHC RBC-ENTMCNC: 36 G/DL — SIGNIFICANT CHANGE UP (ref 32–37)
MCV RBC AUTO: 85.7 FL — SIGNIFICANT CHANGE UP (ref 80–94)
MONOCYTES # BLD AUTO: 0.72 K/UL — HIGH (ref 0.1–0.6)
MONOCYTES NFR BLD AUTO: 10.5 % — HIGH (ref 1.7–9.3)
NEUTROPHILS # BLD AUTO: 5.14 K/UL — SIGNIFICANT CHANGE UP (ref 1.4–6.5)
NEUTROPHILS NFR BLD AUTO: 74.9 % — SIGNIFICANT CHANGE UP (ref 42.2–75.2)
NRBC # BLD: 0 /100 WBCS — SIGNIFICANT CHANGE UP (ref 0–0)
PLATELET # BLD AUTO: 439 K/UL — HIGH (ref 130–400)
POTASSIUM SERPL-MCNC: 3.5 MMOL/L — SIGNIFICANT CHANGE UP (ref 3.5–5)
POTASSIUM SERPL-SCNC: 3.5 MMOL/L — SIGNIFICANT CHANGE UP (ref 3.5–5)
PROCALCITONIN SERPL-MCNC: 0.09 NG/ML — SIGNIFICANT CHANGE UP (ref 0.02–0.1)
PROT SERPL-MCNC: 5.8 G/DL — LOW (ref 6–8)
RBC # BLD: 4.41 M/UL — LOW (ref 4.7–6.1)
RBC # FLD: 12.2 % — SIGNIFICANT CHANGE UP (ref 11.5–14.5)
SARS-COV-2 RNA SPEC QL NAA+PROBE: DETECTED
SODIUM SERPL-SCNC: 137 MMOL/L — SIGNIFICANT CHANGE UP (ref 135–146)
TOTAL CHOLESTEROL/HDL RATIO MEASUREMENT: 2.8 RATIO — LOW (ref 4–5.5)
TRIGL SERPL-MCNC: 96 MG/DL — SIGNIFICANT CHANGE UP (ref 10–149)
WBC # BLD: 6.86 K/UL — SIGNIFICANT CHANGE UP (ref 4.8–10.8)
WBC # FLD AUTO: 6.86 K/UL — SIGNIFICANT CHANGE UP (ref 4.8–10.8)

## 2020-04-15 RX ORDER — ACETAMINOPHEN 500 MG
650 TABLET ORAL EVERY 4 HOURS
Refills: 0 | Status: DISCONTINUED | OUTPATIENT
Start: 2020-04-15 | End: 2020-04-17

## 2020-04-15 RX ADMIN — ENOXAPARIN SODIUM 40 MILLIGRAM(S): 100 INJECTION SUBCUTANEOUS at 21:30

## 2020-04-15 RX ADMIN — Medication 1000 MILLIGRAM(S): at 12:26

## 2020-04-15 RX ADMIN — Medication 400 MILLIGRAM(S): at 18:57

## 2020-04-15 RX ADMIN — ZINC SULFATE TAB 220 MG (50 MG ZINC EQUIVALENT) 220 MILLIGRAM(S): 220 (50 ZN) TAB at 12:26

## 2020-04-15 NOTE — PROGRESS NOTE ADULT - SUBJECTIVE AND OBJECTIVE BOX
I made rounds today with the treatment team including the hospitalist, residents,  nurses and  and discussed the patient's current medical status and discharge  planning needs. in addition I reviewed  the chart as well as laboratoory  data.    T(C): 37.3 (04-15-20 @ 07:00), Max: 37.7 (04-15-20 @ 00:29)  HR: 82 (04-15-20 @ 07:00) (82 - 89)  BP: 116/75 (04-15-20 @ 07:00) (107/64 - 143/92)  RR: 18 (04-15-20 @ 07:00) (18 - 22)  SpO2: 94% (04-15-20 @ 07:00) (93% - 96%)           I reached out to the patient's health care proxy/ responsible family member-           [     ]  I reached                                     and discussed the patient's medical condition,                   family concerns, and discharge planning           [     ]  I left a message with family to call me back when available  (x) voice mail was full  The following was discussed:          [     ] I spent 5-10 minutes on the above discussing medical issues with team members and family    [     ] I spent 11-20 minutes on the above discussing medical issues with team members and family    [     ] I spent 21-30 minutes on the above discussing medical issues with team members and family

## 2020-04-15 NOTE — PROGRESS NOTE ADULT - ASSESSMENT
Vital Signs Last 24 Hrs  T(C): 37.3 (15 Apr 2020 07:00), Max: 37.7 (15 Apr 2020 00:29)  T(F): 99.1 (15 Apr 2020 07:00), Max: 99.9 (15 Apr 2020 00:29)  HR: 82 (15 Apr 2020 07:00) (82 - 103)  BP: 116/75 (15 Apr 2020 07:00) (91/60 - 143/92)  BP(mean): --  RR: 18 (15 Apr 2020 07:00) (18 - 26)  SpO2: 94% (15 Apr 2020 07:00) (90% - 96%)    CBC Full  -  ( 15 Apr 2020 07:56 )  WBC Count : 6.86 K/uL  RBC Count : 4.41 M/uL  Hemoglobin : 13.6 g/dL  Hematocrit : 37.8 %  Platelet Count - Automated : 439 K/uL  Mean Cell Volume : 85.7 fL  Mean Cell Hemoglobin : 30.8 pg  Mean Cell Hemoglobin Concentration : 36.0 g/dL  Auto Neutrophil # : 5.14 K/uL  Auto Lymphocyte # : 0.75 K/uL  Auto Monocyte # : 0.72 K/uL  Auto Eosinophil # : 0.08 K/uL  Auto Basophil # : 0.02 K/uL  Auto Neutrophil % : 74.9 %  Auto Lymphocyte % : 10.9 %  Auto Monocyte % : 10.5 %  Auto Eosinophil % : 1.2 %  Auto Basophil % : 0.3 %    04-15    137  |  100  |  9<L>  ----------------------------<  115<H>  3.5   |  23  |  0.8    Ca    8.4<L>      15 Apr 2020 07:56    TPro  5.8<L>  /  Alb  2.8<L>  /  TBili  0.9  /  DBili  x   /  AST  34  /  ALT  41  /  AlkPhos  68  04-15        MEDICATIONS  (STANDING):  ascorbic acid 1000 milliGRAM(s) Oral daily        enoxaparin Injectable 40 milliGRAM(s) SubCutaneous at bedtime  hydroxychloroquine 400 milliGRAM(s) Oral every 24 hours  hydroxychloroquine   Oral   zinc sulfate 220 milliGRAM(s) Oral daily    MEDICATIONS  (PRN):  ALBUTerol    90 MICROgram(s) HFA Inhaler 2 Puff(s) Inhalation every 6 hours PRN Shortness of Breath  guaifenesin/dextromethorphan  Syrup 5 milliLiter(s) Oral every 6 hours PRN Cough    plan  obtain covid results- pending  monitor o2 sats  continue hcq - d/c if covid negative  monitor labs

## 2020-04-15 NOTE — CONSULT NOTE ADULT - ASSESSMENT
IMPRESSION: Rehab of Debilitation COVID R/O    PRECAUTIONS: [    ] Cardiac  [  x  ] Respiratory  [    ] Seizures [ x   ] Contact Isolation  [ x   ] Droplet Isolation  [ x   ] Other                                                                                                                                                          Airbone  Weight Bearing Status:     RECOMMENDATION: PCA TO AMBULATE    Out of Bed to Chair     DVT/Decubiti Prophylaxis    REHAB PLAN:                                                                     AS ORDERED  [     ] Bedside P/T 3-5 times a week   [  x   ] Bedside O/T  2-3 times a week   [     ] No Rehab Therapy Indicated   [     ]  Speech Therapy   Conditioning/ROM                                 ADL  Bed Mobility                                            Conditioning/ROM  Transfers                                                  Bed Mobility  Sitting /Standing Balance                      Transfers                                        Gait Training                                            Sitting/Standing Balance  Stair Training [   ]Applicable                 Home equipment Eval                                                                     Splinting  [   ] Only      GOALS:   ADL   [x    ]   Independent         Transfers  [ x   ] Independent            Ambulation  [   x  ] Independent     [     ] With device                            [    ]  CG                                               [    ]  CG                                                    [     ] CG                            [    ] Min A                                          [    ] Min A                                                [     ] Min  A          DISCHARGE PLAN:   [     ]  Good candidate for Intensive Rehabilitation/Hospital based                                             Will tolerate 3hrs Intensive Rehab Daily                                       [      ]  Short Term Rehab in Skilled Nursing Facility                                       [   x   ]  Home with Outpatient or VN services                                         [      ]  Possible Candidate for Intensive Hospital based Rehab

## 2020-04-15 NOTE — CONSULT NOTE ADULT - SUBJECTIVE AND OBJECTIVE BOX
Patient is a 59y old  Male who presents with a chief complaint of Viral Pneumonia r/o COVID-19 (14 Apr 2020 16:35)    HPI:  Patient is 58 y/o male with medical hx of Osteoarthritis, Recent diagnosis of bladder tumor (being planned for resection) presented from home with CC of URI symptoms for 2 weeks and New onset Dyspnea since yesterday.  Symptoms initially started 2 weeks with sore throat and nonproductive cough, his PMD prescribed a course of Ciprofloxacin which he completed. However, since yesterday he started having difficulty breathing, So he  went to his PMD, CXR was done and he was  to go to ED for evaluation of " fluid in his lungs".     He denies CP, Palpitation Headaches, dizziness, Abdominal pain, N/V/D, diarrhea, Leg pain/swelling, no recent travel, no known sick or COVID contacts, he endorses a 2 week hx of urinary frequency, no foul smelling urine, no dysuria no hematuria    IN ED: Saturating 94% on RA with desaturation to 90% with Mild Exertion   Vitals: Borderline BP: 901/60, HR: 103, RR: 26  Labs Significant for Lymphopenia and Mild Transaminitis  CXR: Bilateral/multifocal  Patchy airspace opacity (14 Apr 2020 16:35)      PAST MEDICAL & SURGICAL HISTORY:  OA (osteoarthritis): knees  Bladder tumor  H/O left nephrectomy: 2003 per pt &quot;trauma&quot;      Hospital Course:Viral Pneumonia, Multifocal  R/O COVID-19  -Mild sepsis on Presentation: hypotension and Tachycardia  -CXR: Bilateral/multifocal Patchy airspace opacity  -Saturating 94% ar rest >>> 90% with Ambulation   -Labs significant for Lymphopenia and Mild Transaminitis  -Denies Sick/Known covid Contacts ???  -Will Start Plaquenil given Hypoxia and CXR changes  -EP consult placed for QTC monitoring, Baseline QTC: 4/14: 428  -Supportive care with IVF, Vitamin C and Zinc   - f/u COVID19 swab, procalcitonin, CRP, ferritin,   - Tylenol PRN for fevers, Trend CBC and LFTs.  -  ID consult if no improvement     Bladder Tumor  -Recently diagnosed on CT Scan  from Feb 20  -Scheduled for Resection   -O/P F/up with Dr Diaz    H/O Osteoarthritis  -Not on Meds       TODAY'S SUBJECTIVE & REVIEW OF SYMPTOMS:     Constitutional Weakness   Cardio WNL   Resp SOB   GI WNL  Heme WNL  Endo WNL  Skin WNL  MSK WNL  Neuro WNL  Cognitive WNL  Psych WNL      MEDICATIONS  (STANDING):  ascorbic acid 1000 milliGRAM(s) Oral daily  enoxaparin Injectable 40 milliGRAM(s) SubCutaneous at bedtime  hydroxychloroquine 400 milliGRAM(s) Oral every 24 hours  hydroxychloroquine   Oral   zinc sulfate 220 milliGRAM(s) Oral daily    MEDICATIONS  (PRN):  ALBUTerol    90 MICROgram(s) HFA Inhaler 2 Puff(s) Inhalation every 6 hours PRN Shortness of Breath  guaifenesin/dextromethorphan  Syrup 5 milliLiter(s) Oral every 6 hours PRN Cough      FAMILY HISTORY:  No pertinent family history in first degree relatives      Allergies    No Known Allergies    Intolerances        SOCIAL HISTORY:    [    ] Etoh  [    ] Smoking  [    ] Substance abuse     Home Environment:  [    ] Home Alone  [  x  ] Lives with Family  [    ] Home Health Aid    Dwelling:  [    ] Apartment  [  x  ] Private House  [    ] Adult Home  [    ] Skilled Nursing Facility      [    ] Short Term  [    ] Long Term  [   x ] Stairs                           [    ] Elevator     FUNCTIONAL STATUS PTA: (Check all that apply)  Ambulation: [  x   ]Independent    [    ] Dependent     [    ] Non-Ambulatory  Assistive Device: [    ] SA Cane  [    ]  Q Cane  [    ] Walker  [    ]  Wheelchair  ADL : [ x   ] Independent  [    ]  Dependent       Vital Signs Last 24 Hrs  T(C): 37.3 (15 Apr 2020 07:00), Max: 37.7 (15 Apr 2020 00:29)  T(F): 99.1 (15 Apr 2020 07:00), Max: 99.9 (15 Apr 2020 00:29)  HR: 82 (15 Apr 2020 07:00) (82 - 103)  BP: 116/75 (15 Apr 2020 07:00) (91/60 - 143/92)  BP(mean): --  RR: 18 (15 Apr 2020 07:00) (18 - 26)  SpO2: 94% (15 Apr 2020 07:00) (90% - 96%)      PHYSICAL EXAM:Referenced    FUNCTIONAL STATUS:  Bed Mobility: [   ]  Independent [    ]  Supervision [  x  ]  Needs Assistance [  ]  N/A  Transfers: [    ]  Independent [    ]  Supervision [    ]  Needs Assistance [x    ]  N/A    Ambulation:  [    ]  Independent [    ]  Supervision [    ]  Needs Assistance [ x   ]  N/A   ADL:  [    ]   Independent [    ] Requires Assistance [    x] N/A       LABS:                        13.6   6.86  )-----------( 439      ( 15 Apr 2020 07:56 )             37.8     04-15    137  |  100  |  9<L>  ----------------------------<  115<H>  3.5   |  23  |  0.8    Ca    8.4<L>      15 Apr 2020 07:56    TPro  5.8<L>  /  Alb  2.8<L>  /  TBili  0.9  /  DBili  x   /  AST  34  /  ALT  41  /  AlkPhos  68  04-15          RADIOLOGY & ADDITIONAL STUDIES:

## 2020-04-15 NOTE — PROGRESS NOTE ADULT - SUBJECTIVE AND OBJECTIVE BOX
Patient is 60 y/o male with medical hx of Osteoarthritis, Recent diagnosis of bladder tumor (being planned for resection) presented from home with CC of URI symptoms for 2 weeks and New onset Dyspnea since yesterday.  Symptoms initially started 2 weeks with sore throat and nonproductive cough, his PMD prescribed a course of Ciprofloxacin which he completed. However, since yesterday he started having difficulty breathing, So he  went to his PMD, CXR was done and he was  to go to ED for evaluation of " fluid in his lungs"

## 2020-04-16 LAB
ALBUMIN SERPL ELPH-MCNC: 2.8 G/DL — LOW (ref 3.5–5.2)
ALP SERPL-CCNC: 68 U/L — SIGNIFICANT CHANGE UP (ref 30–115)
ALT FLD-CCNC: 36 U/L — SIGNIFICANT CHANGE UP (ref 0–41)
ANION GAP SERPL CALC-SCNC: 13 MMOL/L — SIGNIFICANT CHANGE UP (ref 7–14)
AST SERPL-CCNC: 26 U/L — SIGNIFICANT CHANGE UP (ref 0–41)
BILIRUB SERPL-MCNC: 0.6 MG/DL — SIGNIFICANT CHANGE UP (ref 0.2–1.2)
BUN SERPL-MCNC: 10 MG/DL — SIGNIFICANT CHANGE UP (ref 10–20)
CALCIUM SERPL-MCNC: 8.4 MG/DL — LOW (ref 8.5–10.1)
CHLORIDE SERPL-SCNC: 100 MMOL/L — SIGNIFICANT CHANGE UP (ref 98–110)
CO2 SERPL-SCNC: 23 MMOL/L — SIGNIFICANT CHANGE UP (ref 17–32)
CREAT SERPL-MCNC: 0.8 MG/DL — SIGNIFICANT CHANGE UP (ref 0.7–1.5)
CRP SERPL-MCNC: 18.73 MG/DL — HIGH (ref 0–0.4)
D DIMER BLD IA.RAPID-MCNC: 332 NG/ML DDU — HIGH (ref 0–230)
GLUCOSE SERPL-MCNC: 163 MG/DL — HIGH (ref 70–99)
HCT VFR BLD CALC: 38 % — LOW (ref 42–52)
HGB BLD-MCNC: 13.7 G/DL — LOW (ref 14–18)
MCHC RBC-ENTMCNC: 31.4 PG — HIGH (ref 27–31)
MCHC RBC-ENTMCNC: 36.1 G/DL — SIGNIFICANT CHANGE UP (ref 32–37)
MCV RBC AUTO: 87 FL — SIGNIFICANT CHANGE UP (ref 80–94)
NRBC # BLD: 0 /100 WBCS — SIGNIFICANT CHANGE UP (ref 0–0)
PLATELET # BLD AUTO: 480 K/UL — HIGH (ref 130–400)
POTASSIUM SERPL-MCNC: 3.6 MMOL/L — SIGNIFICANT CHANGE UP (ref 3.5–5)
POTASSIUM SERPL-SCNC: 3.6 MMOL/L — SIGNIFICANT CHANGE UP (ref 3.5–5)
PROT SERPL-MCNC: 5.9 G/DL — LOW (ref 6–8)
RBC # BLD: 4.37 M/UL — LOW (ref 4.7–6.1)
RBC # FLD: 12.2 % — SIGNIFICANT CHANGE UP (ref 11.5–14.5)
SODIUM SERPL-SCNC: 136 MMOL/L — SIGNIFICANT CHANGE UP (ref 135–146)
WBC # BLD: 6.06 K/UL — SIGNIFICANT CHANGE UP (ref 4.8–10.8)
WBC # FLD AUTO: 6.06 K/UL — SIGNIFICANT CHANGE UP (ref 4.8–10.8)

## 2020-04-16 PROCEDURE — 99233 SBSQ HOSP IP/OBS HIGH 50: CPT

## 2020-04-16 RX ADMIN — ZINC SULFATE TAB 220 MG (50 MG ZINC EQUIVALENT) 220 MILLIGRAM(S): 220 (50 ZN) TAB at 13:22

## 2020-04-16 RX ADMIN — Medication 1000 MILLIGRAM(S): at 13:22

## 2020-04-16 RX ADMIN — Medication 400 MILLIGRAM(S): at 16:41

## 2020-04-16 RX ADMIN — ENOXAPARIN SODIUM 40 MILLIGRAM(S): 100 INJECTION SUBCUTANEOUS at 20:44

## 2020-04-16 NOTE — PROGRESS NOTE ADULT - SUBJECTIVE AND OBJECTIVE BOX
Medicine Progress Note    Patient is a 59y old  Male who presents with a chief complaint of Viral Pneumonia r/o COVID-19 (15 Apr 2020 15:33)    Patient is 60 y/o male with PMH of OA, bladder tumor s/p nephrectomy (Urologist Dr. Diaz) a/w viral Pneumonia r/o COVID-19. Pt's COVID results came back positive on 4/14. Pt seen at bedside lying comfortably. Pt admits to still having a cough but his breathing has improved. Pt states he tried to do some exercise this morning but still feels weak. Pt admits to eat and drinking well. Denies SOB, chest pain, N/V, diarrhea or constipation.       MEDICATIONS  (STANDING):  ascorbic acid 1000 milliGRAM(s) Oral daily  enoxaparin Injectable 40 milliGRAM(s) SubCutaneous at bedtime  hydroxychloroquine 400 milliGRAM(s) Oral every 24 hours  hydroxychloroquine   Oral   zinc sulfate 220 milliGRAM(s) Oral daily    MEDICATIONS  (PRN):  acetaminophen   Tablet .. 650 milliGRAM(s) Oral every 4 hours PRN Temp greater or equal to 38C (100.4F), Mild Pain (1 - 3)  ALBUTerol    90 MICROgram(s) HFA Inhaler 2 Puff(s) Inhalation every 6 hours PRN Shortness of Breath  guaifenesin/dextromethorphan  Syrup 5 milliLiter(s) Oral every 6 hours PRN Cough    CAPILLARY BLOOD GLUCOSE        I&O's Summary      PHYSICAL EXAM:  Vital Signs Last 24 Hrs  T(C): 36.6 (16 Apr 2020 07:35), Max: 37.3 (16 Apr 2020 00:10)  T(F): 97.8 (16 Apr 2020 07:35), Max: 99.2 (16 Apr 2020 00:10)  HR: 83 (16 Apr 2020 08:40) (71 - 85)  BP: 109/63 (16 Apr 2020 08:40) (96/51 - 127/81)  RR: 20 (16 Apr 2020 08:40) (18 - 20)  SpO2: 95% (16 Apr 2020 08:40) (94% - 98%)    CONSTITUTIONAL: NAD, well-developed, well-groomed, lying in bed comfortably       LABS:                        13.7   6.06  )-----------( 480      ( 16 Apr 2020 09:03 )             38.0     04-16    136  |  100  |  10  ----------------------------<  163<H>  3.6   |  23  |  0.8    Ca    8.4<L>      16 Apr 2020 09:03    TPro  5.9<L>  /  Alb  2.8<L>  /  TBili  0.6  /  DBili  x   /  AST  26  /  ALT  36  /  AlkPhos  68  04-16          COVID-19 PCR: Detected (14 Apr 2020 13:13)      RADIOLOGY & ADDITIONAL TESTS:  Imaging from Last 24 Hours:  < from: Xray Chest 2 Views PA/Lat (04.14.20 @ 09:09) >  EXAM:  XR CHEST PA LAT 2V            PROCEDURE DATE:  04/14/2020            INTERPRETATION:  Clinical History / Reason for exam: Shortness of breath    Comparison : Chest radiograph 3/9/2020.    Technique/Positioning: Frontal and lateral chest radiographs.    Findings:    Support devices: None.    Cardiac/mediastinum/hilum: Unremarkable.    Lung parenchyma/Pleura: New patchy bilateral airspace opacities. No pleural effusion or pneumothorax.    Skeleton/soft tissues: Unremarkable.    Impression:     New patchy bilateral airspace opacities, compatible with multifocal pneumonia -considerations include atypical viral etiology such as COVID-19. Radiographic follow-up recommended.    Spoke with GREGORY SMITH MD   on 4/14/2020 9:27 AM with readback.        DIDIER SHAFFER M.D., ATTENDING RADIOLOGIST  This document has been electronically signed. Apr 14 2020  9:28AM        < end of copied text >  	    Electrocardiogram/QTc Interval:  < from: 12 Lead ECG (04.14.20 @ 15:48) >  Ventricular Rate 79 BPM    Atrial Rate 79 BPM    P-R Interval 146 ms    QRS Duration 88 ms    Q-T Interval 374 ms    QTC Calculation(Bezet) 428 ms    P Axis 52 degrees    R Axis 62 degrees    T Axis 62 degrees    Diagnosis Line *** Poor data quality, interpretation may be adversely affected  Normal sinus rhythm  Normal ECG    Confirmed by Braulio Fields ([id number]) on 4/14/2020 5:05:06 PM    < end of copied text >        COORDINATION OF CARE:  Care Discussed with Consultants/Other Providers:  Progress Note:   · Provider Specialty	Physiatry	    Reason for Admission:   Reason for Admission:  · Reason for Admission	Viral Pneumonia r/o COVID-19	    Telehealth:   Telehealth:  · Telehealth?	No	      · Subjective and Objective: 	  I made rounds today with the treatment team including the hospitalist, residents,  nurses and  and discussed the patient's current medical status and discharge  planning needs. in addition I reviewed  the chart as well as laboratoory  data.    T(C): 37.3 (04-15-20 @ 07:00), Max: 37.7 (04-15-20 @ 00:29)  HR: 82 (04-15-20 @ 07:00) (82 - 89)  BP: 116/75 (04-15-20 @ 07:00) (107/64 - 143/92)  RR: 18 (04-15-20 @ 07:00) (18 - 22)  SpO2: 94% (04-15-20 @ 07:00) (93% - 96%)           I reached out to the patient's health care proxy/ responsible family member-           [     ]  I reached                                     and discussed the patient's medical condition,                   family concerns, and discharge planning           [     ]  I left a message with family to call me back when available  (x) voice mail was full  The following was discussed:      [     ] I spent 5-10 minutes on the above discussing medical issues with team members and family    [     ] I spent 11-20 minutes on the above discussing medical issues with team members and family    [     ] I spent 21-30 minutes on the above discussing medical issues with team members and family      Electronic Signatures:  Cristine Jimenez ()  (Signed 15-Apr-2020 15:39)  	Authored: Progress Note, Reason for Admission, Telehealth, Subjective and Objective      Last Updated: 15-Apr-2020 15:39 by Cristine Jimenez ()

## 2020-04-16 NOTE — PROGRESS NOTE ADULT - ATTENDING COMMENTS
Patient seen and examined independently. Agree with resident note with exceptions. Case discussed with housestaff, nursing and patient    COVID PNA   - CRP downtrending, likely D/C in AM if oxygenation stable

## 2020-04-16 NOTE — PROGRESS NOTE ADULT - ASSESSMENT
58 y/o male with PMH of OA, bladder tumor s/p nephrectomy (Urologist Dr. Diaz) a/w viral Pneumonia r/o COVID-19. Pt detected COVID-19 + 4/14.     PULMONARY  #Viral COVID-19 Pneumonia  - Maintain on airborne isolation.  -Mild sepsis on Presentation: hypotension and Tachycardia  -CXR: Bilateral/multifocal Patchy airspace opacity  -Saturating 95% on RA   -Plaquenil 400mg QD started 4/15, end date 4/18  -cont Robitussin DM as needed   -acetaminophen 650 mg PO q4h PRN fever. Limit use of NSAIDs.  - HFA albuterol Q6 hour PRN via MDI. Would avoid nebulized preparations to limit risk of aerosol formation.    CARDIO  - Baseline QTC: 4/14: 428  - No need for further cardiac monitoring QTc is <500    GI  -PO regular diet    /RENAL  #Bladder Tumor  -Recently diagnosed on CT Scan  from Feb 20  -s/p nephrectomy   -O/P F/up with Dr Diaz  -Cr 0.7    ID  #COVID PNA  -Plaquenil 800mg STAT dose on 4/14, now on 400mg QD 4/15     HEM  -Lovenox      NEURO  -intact no active issues    ACTIVITY:  - cont. PT/OT  - encourage to ambulate as tolerated     DISPO: as long as CRP, DDimer, Ferritin are downtrending and O2 saturations maintain stable can be discharge to home by tomorrow                 - Labs Testing: Daily or As Needed: CBC w/ diff, CMP; Every 3 days: CRP, Ferritin, Procalcitonin.

## 2020-04-17 ENCOUNTER — TRANSCRIPTION ENCOUNTER (OUTPATIENT)
Age: 60
End: 2020-04-17

## 2020-04-17 VITALS
DIASTOLIC BLOOD PRESSURE: 63 MMHG | SYSTOLIC BLOOD PRESSURE: 93 MMHG | TEMPERATURE: 98 F | OXYGEN SATURATION: 93 % | RESPIRATION RATE: 20 BRPM | HEART RATE: 82 BPM

## 2020-04-17 LAB
ALBUMIN SERPL ELPH-MCNC: 2.7 G/DL — LOW (ref 3.5–5.2)
ALP SERPL-CCNC: 65 U/L — SIGNIFICANT CHANGE UP (ref 30–115)
ALT FLD-CCNC: 41 U/L — SIGNIFICANT CHANGE UP (ref 0–41)
ANION GAP SERPL CALC-SCNC: 12 MMOL/L — SIGNIFICANT CHANGE UP (ref 7–14)
AST SERPL-CCNC: 40 U/L — SIGNIFICANT CHANGE UP (ref 0–41)
BASOPHILS # BLD AUTO: 0.01 K/UL — SIGNIFICANT CHANGE UP (ref 0–0.2)
BASOPHILS NFR BLD AUTO: 0.2 % — SIGNIFICANT CHANGE UP (ref 0–1)
BILIRUB SERPL-MCNC: 0.5 MG/DL — SIGNIFICANT CHANGE UP (ref 0.2–1.2)
BUN SERPL-MCNC: 7 MG/DL — LOW (ref 10–20)
CALCIUM SERPL-MCNC: 8.4 MG/DL — LOW (ref 8.5–10.1)
CHLORIDE SERPL-SCNC: 103 MMOL/L — SIGNIFICANT CHANGE UP (ref 98–110)
CO2 SERPL-SCNC: 25 MMOL/L — SIGNIFICANT CHANGE UP (ref 17–32)
CREAT SERPL-MCNC: 0.6 MG/DL — LOW (ref 0.7–1.5)
CRP SERPL-MCNC: 15.39 MG/DL — HIGH (ref 0–0.4)
CRP SERPL-MCNC: 8.36 MG/DL — HIGH (ref 0–0.4)
D DIMER BLD IA.RAPID-MCNC: 288 NG/ML DDU — HIGH (ref 0–230)
EOSINOPHIL # BLD AUTO: 0.13 K/UL — SIGNIFICANT CHANGE UP (ref 0–0.7)
EOSINOPHIL NFR BLD AUTO: 2.4 % — SIGNIFICANT CHANGE UP (ref 0–8)
FERRITIN SERPL-MCNC: 1023 NG/ML — HIGH (ref 30–400)
FERRITIN SERPL-MCNC: 861 NG/ML — HIGH (ref 30–400)
GLUCOSE SERPL-MCNC: 123 MG/DL — HIGH (ref 70–99)
HCT VFR BLD CALC: 39.2 % — LOW (ref 42–52)
HGB BLD-MCNC: 13.8 G/DL — LOW (ref 14–18)
IMM GRANULOCYTES NFR BLD AUTO: 2 % — HIGH (ref 0.1–0.3)
LYMPHOCYTES # BLD AUTO: 0.75 K/UL — LOW (ref 1.2–3.4)
LYMPHOCYTES # BLD AUTO: 13.8 % — LOW (ref 20.5–51.1)
MCHC RBC-ENTMCNC: 30.6 PG — SIGNIFICANT CHANGE UP (ref 27–31)
MCHC RBC-ENTMCNC: 35.2 G/DL — SIGNIFICANT CHANGE UP (ref 32–37)
MCV RBC AUTO: 86.9 FL — SIGNIFICANT CHANGE UP (ref 80–94)
MONOCYTES # BLD AUTO: 0.6 K/UL — SIGNIFICANT CHANGE UP (ref 0.1–0.6)
MONOCYTES NFR BLD AUTO: 11 % — HIGH (ref 1.7–9.3)
NEUTROPHILS # BLD AUTO: 3.84 K/UL — SIGNIFICANT CHANGE UP (ref 1.4–6.5)
NEUTROPHILS NFR BLD AUTO: 70.6 % — SIGNIFICANT CHANGE UP (ref 42.2–75.2)
NRBC # BLD: 0 /100 WBCS — SIGNIFICANT CHANGE UP (ref 0–0)
PLATELET # BLD AUTO: 523 K/UL — HIGH (ref 130–400)
POTASSIUM SERPL-MCNC: 4.4 MMOL/L — SIGNIFICANT CHANGE UP (ref 3.5–5)
POTASSIUM SERPL-SCNC: 4.4 MMOL/L — SIGNIFICANT CHANGE UP (ref 3.5–5)
PROCALCITONIN SERPL-MCNC: 0.05 NG/ML — SIGNIFICANT CHANGE UP (ref 0.02–0.1)
PROT SERPL-MCNC: 5.9 G/DL — LOW (ref 6–8)
RBC # BLD: 4.51 M/UL — LOW (ref 4.7–6.1)
RBC # FLD: 12.2 % — SIGNIFICANT CHANGE UP (ref 11.5–14.5)
SODIUM SERPL-SCNC: 140 MMOL/L — SIGNIFICANT CHANGE UP (ref 135–146)
WBC # BLD: 5.44 K/UL — SIGNIFICANT CHANGE UP (ref 4.8–10.8)
WBC # FLD AUTO: 5.44 K/UL — SIGNIFICANT CHANGE UP (ref 4.8–10.8)

## 2020-04-17 PROCEDURE — 99239 HOSP IP/OBS DSCHRG MGMT >30: CPT

## 2020-04-17 PROCEDURE — 71045 X-RAY EXAM CHEST 1 VIEW: CPT | Mod: 26

## 2020-04-17 RX ORDER — ALBUTEROL 90 UG/1
2 AEROSOL, METERED ORAL
Qty: 0 | Refills: 0 | DISCHARGE
Start: 2020-04-17

## 2020-04-17 RX ORDER — HYDROXYCHLOROQUINE SULFATE 200 MG
2 TABLET ORAL
Qty: 0 | Refills: 0 | DISCHARGE
Start: 2020-04-17

## 2020-04-17 RX ORDER — ACETAMINOPHEN 500 MG
2 TABLET ORAL
Qty: 0 | Refills: 0 | DISCHARGE
Start: 2020-04-17

## 2020-04-17 RX ORDER — ASCORBIC ACID 60 MG
1 TABLET,CHEWABLE ORAL
Qty: 0 | Refills: 0 | DISCHARGE
Start: 2020-04-17

## 2020-04-17 RX ORDER — ZINC SULFATE TAB 220 MG (50 MG ZINC EQUIVALENT) 220 (50 ZN) MG
1 TAB ORAL
Qty: 0 | Refills: 0 | DISCHARGE
Start: 2020-04-17

## 2020-04-17 RX ORDER — LACTOBACILLUS ACIDOPHILUS 100MM CELL
2 CAPSULE ORAL
Qty: 0 | Refills: 0 | DISCHARGE

## 2020-04-17 RX ORDER — GUAIFENESIN/DEXTROMETHORPHAN 600MG-30MG
5 TABLET, EXTENDED RELEASE 12 HR ORAL
Qty: 0 | Refills: 0 | DISCHARGE
Start: 2020-04-17

## 2020-04-17 RX ADMIN — Medication 400 MILLIGRAM(S): at 14:11

## 2020-04-17 RX ADMIN — ZINC SULFATE TAB 220 MG (50 MG ZINC EQUIVALENT) 220 MILLIGRAM(S): 220 (50 ZN) TAB at 11:50

## 2020-04-17 RX ADMIN — Medication 1000 MILLIGRAM(S): at 11:50

## 2020-04-17 RX ADMIN — Medication 5 MILLILITER(S): at 01:56

## 2020-04-17 NOTE — DISCHARGE NOTE PROVIDER - NSDCMRMEDTOKEN_GEN_ALL_CORE_FT
acetaminophen 325 mg oral tablet: 2 tab(s) orally every 4 hours, As needed, Temp greater or equal to 38C (100.4F), or pain  albuterol 90 mcg/inh inhalation aerosol: 2 puff(s) inhaled every 6 hours, As needed, Shortness of Breath  ascorbic acid 1000 mg oral tablet: 1 tab(s) orally once a day  guaifenesin-dextromethorphan 100 mg-10 mg/5 mL oral liquid: 5 milliliter(s) orally every 6 hours, As needed, Cough  hydroxychloroquine 200 mg oral tablet: 2 tab(s) (400mg) orally once on SATURDAY APRIL 18, 2020 (your last dose)  lactobacillus acidophilus oral tablet: 2 tab(s) orally once a day  melatonin 5 mg oral tablet: 1 tab(s) orally once a day (at bedtime), As Needed  zinc sulfate 220 mg oral capsule: 1 cap(s) orally once a day

## 2020-04-17 NOTE — DISCHARGE NOTE PROVIDER - HOSPITAL COURSE
HPI:    Patient is 58 y/o male with medical hx of Osteoarthritis, Recent diagnosis of bladder tumor (being planned for resection) presented from home on 4/14/2020 with CC of URI symptoms for 2 weeks and New onset Dyspnea since yesterday.  Symptoms initially started 2 weeks with sore throat and nonproductive cough, his PMD prescribed a course of Ciprofloxacin which he completed. However, since the day PTA he started having difficulty breathing, so he  went to his PMD, CXR was done and he was  to go to ED for evaluation of " fluid in his lungs".         He denied CP, Palpitation Headaches, dizziness, Abdominal pain, N/V/D, diarrhea, Leg pain/swelling, no recent travel, no known sick or COVID contacts, he endorsed a 2 week hx of urinary frequency, no foul smelling urine, no dysuria no hematuria        IN ED: Saturating 94% on RA with desaturation to 90% with Mild Exertion     Vitals: Borderline BP: 91/60, HR: 103, RR: 26    Labs Significant for Lymphopenia and Mild Transaminitis    CXR: Bilateral/multifocal  Patchy airspace opacity (14 Apr 2020 16:35)        Vital Signs Last 24 Hrs    T(C): 36.7 (17 Apr 2020 11:00), Max: 36.9 (17 Apr 2020 00:15)    T(F): 98.1 (17 Apr 2020 11:00), Max: 98.5 (17 Apr 2020 00:15)    HR: 82 (17 Apr 2020 11:00) (70 - 89)    BP: 93/63 (17 Apr 2020 11:00) (93/63 - 138/87)    BP(mean): --    RR: 20 (17 Apr 2020 11:00) (20 - 20)    SpO2: 93% (17 Apr 2020 11:00) (93% - 99%)            LABS:                                13.8     5.44  )-----------( 523      ( 17 Apr 2020 07:01 )               39.2            lymphocytes 4/17 = 13.8                               4/16 = 10.9                              4/15 = 10.4                              4/14 =  9.4    04-17        140  |  103  |  7<L>    ----------------------------<  123<H>    4.4   |  25  |  0.6<L>        Ca    8.4<L>      17 Apr 2020 07:01        TPro  5.9<L>  /  Alb  2.7<L>  /  TBili  0.5  /  DBili  x   /  AST  40  /  ALT  41  /  AlkPhos  65  04-17                                                                                       < from: Xray Chest 1 View- PORTABLE-Routine (04.17.20 @ 05:17) >        PROCEDURE DATE:  04/17/2020                      INTERPRETATION:  Clinical History / Reason for exam: COVID positive.        Comparison : Chest radiograph 4/14/2020.        Technique/Positioning: Single frontal view of the chest.        Findings:        Support devices: None.        Cardiac/mediastinum/hilum: Unchanged.        Lung parenchyma/Pleura: Mildly increased bilateral patchy airspace opacities. No pneumothorax.        Skeleton/soft tissues: Unchanged.        Impression:          Mildly increased bilateral patchy airspace opacities.        < end of copied text >        The patient is doing well, he is independent and pulse ox is stable on room air = 95%.    He is stable for discharge home today. He is being treated with plaquenil x 5 days (800mg po x1 on 4/14, then 400mg po daily 4/15-4/18).    He had pre-admission testing at [facility] on 3/9/2020 to prepare for resection of bladder tumor, however the surgery has been postponed until the patient    has recovered from covid19. [facility] was called and recommended that the patient call his urologist, Dr. Diaz, on Monday, 4/20/2020, for further information regarding when the surgery will be rescheduled.    He should also follow up closely with his PCP, Dr. Oanh Escobar.    He was instructed to continue self-isolation for 14 days total, and to be symptom-free for 7 days. His daughter Pat was also informed of her father's    discharge, medications and isolation instructions. If he should develop chest pain, worsening SOB, high fevers, he should return to the hospital right away. HPI:    Patient is 60 y/o male with medical hx of Osteoarthritis, hx left nephrectomy, recent diagnosis of bladder tumor (being planned for resection--had initial pre-admission testing done 3/9/2020), presented from home on 4/14/2020 with CC of URI symptoms for 2 weeks and New onset Dyspnea since yesterday.  Symptoms initially started 2 weeks with sore throat and nonproductive cough, his PMD prescribed a course of Ciprofloxacin which he completed. However, since the day PTA he started having difficulty breathing, so he  went to his PMD, CXR was done and he was  to go to ED for evaluation of " fluid in his lungs".         He denied CP, Palpitation Headaches, dizziness, Abdominal pain, N/V/D, diarrhea, Leg pain/swelling, no recent travel, no known sick or COVID contacts, he endorsed a 2 week hx of urinary frequency, no foul smelling urine, no dysuria no hematuria        IN ED: Saturating 94% on RA with desaturation to 90% with Mild Exertion     Vitals: Borderline BP: 91/60, HR: 103, RR: 26    Labs Significant for Lymphopenia and Mild Transaminitis    CXR: Bilateral/multifocal  Patchy airspace opacity (14 Apr 2020 16:35)        Vital Signs Last 24 Hrs    T(C): 36.7 (17 Apr 2020 11:00), Max: 36.9 (17 Apr 2020 00:15)    T(F): 98.1 (17 Apr 2020 11:00), Max: 98.5 (17 Apr 2020 00:15)    HR: 82 (17 Apr 2020 11:00) (70 - 89)    BP: 93/63 (17 Apr 2020 11:00) (93/63 - 138/87)    BP(mean): --    RR: 20 (17 Apr 2020 11:00) (20 - 20)    SpO2: 93% (17 Apr 2020 11:00) (93% - 99%)            LABS:                                13.8     5.44  )-----------( 523      ( 17 Apr 2020 07:01 )               39.2            lymphocytes 4/17 = 13.8                               4/16 = 10.9                              4/15 = 10.4                              4/14 =  9.4    04-17        140  |  103  |  7<L>    ----------------------------<  123<H>    4.4   |  25  |  0.6<L>        Ca    8.4<L>      17 Apr 2020 07:01        TPro  5.9<L>  /  Alb  2.7<L>  /  TBili  0.5  /  DBili  x   /  AST  40  /  ALT  41  /  AlkPhos  65  04-17        MARKERS:          D-dimer 4/17 = 288                     4/16 = 332        Ferritin 4/16 = 1023                   4/15 = 1216        CRP 4/16 = 15.39               4/15 = 18.73               4/14 = 19.98        QTC 4/14 = 428                                                                                       < from: Xray Chest 1 View- PORTABLE-Routine (04.17.20 @ 05:17) >        PROCEDURE DATE:  04/17/2020          INTERPRETATION:  Clinical History / Reason for exam: COVID positive.        Comparison : Chest radiograph 4/14/2020.        Technique/Positioning: Single frontal view of the chest.        Findings:        Support devices: None.        Cardiac/mediastinum/hilum: Unchanged.        Lung parenchyma/Pleura: Mildly increased bilateral patchy airspace opacities. No pneumothorax.        Skeleton/soft tissues: Unchanged.        Impression:          Mildly increased bilateral patchy airspace opacities.        < end of copied text >        The patient is doing well, he is independent and pulse ox is stable on room air = 95%.    He is stable for discharge home today. He is being treated with plaquenil x 5 days (800mg po x1 on 4/14, then 400mg po daily 4/15-4/18).    He had pre-admission testing at [facility] on 3/9/2020 to prepare for resection of bladder tumor, however the surgery has been postponed until the patient    has recovered from covid19. [facility] and recommended that the patient call his urologist, Dr. Diaz, on Monday, 4/20/2020, for further information regarding when the surgery will be rescheduled.    He should also follow up closely with his PCP, Dr. Oanh Escobar.    He was instructed to continue self-isolation for 14 days total, and to be symptom-free for 7 days. His daughter Pat was also informed of her father's    discharge, medications and isolation instructions. If he should develop chest pain, worsening SOB, high fevers, he should return to the hospital right away.

## 2020-04-17 NOTE — DISCHARGE NOTE PROVIDER - NSDCCPCAREPLAN_GEN_ALL_CORE_FT
PRINCIPAL DISCHARGE DIAGNOSIS  Diagnosis: Suspected COVID-19 virus infection  Assessment and Plan of Treatment: You were diagnosed with the covid19 virus infection on 4/14/2020. You are being treated with 5 days of Plaquenil (hydroxychloroquine), your last dose will be Saturday April 18. Continue to remain on isolation at home for 14 days total, and you should also have no symptoms for 7 days (eg  - no fever, no cough). You can take Tylenol as needed for pain, body aches, fever, get plenty of rest and drink plenty of liquids. Please keep in touch with your primary care provider (PCP), Dr. Escobar. If you experience any worsening of symptoms, eg high fever, trouble breathing, call 911 and go back to the hospital.      SECONDARY DISCHARGE DIAGNOSES  Diagnosis: Bladder tumor  Assessment and Plan of Treatment: Please call Dr. Diaz, your urologist, on Monday April 20, 2020, and he will discuss with you about rescheduling the surgery to remove the bladder tumor.

## 2020-04-17 NOTE — DISCHARGE NOTE NURSING/CASE MANAGEMENT/SOCIAL WORK - PATIENT PORTAL LINK FT
You can access the FollowMyHealth Patient Portal offered by [facility] by registering at the following website: [email or web address]. By joining [facility]’s FollowMyHealth portal, you will also be able to view your health information using other applications (apps) compatible with our system.

## 2020-04-17 NOTE — PROGRESS NOTE ADULT - REASON FOR ADMISSION
Viral Pneumonia r/o COVID-19

## 2020-04-17 NOTE — DISCHARGE NOTE PROVIDER - NSDCFUADDINST_GEN_ALL_CORE_FT
***PLEASE CALL 911 AND GO TO THE ER IF YOU EXPERIENCE WORSENING SYMPTOMS: EG HIGH FEVER, DIFFICULTY BREATHING, SHORTNESS OF BREATH***    ***PLEASE REMAIN ON ISOLATION AT HOME FOR 14 DAYS TOTAL; YOU SHOULD HAVE NO SYMPTOMS (NO FEVER, NO COUGH) FOR 7 DAYS***    ***PLEASE FOLLOW UP CLOSELY WITH YOUR PRIMARY CARE PROVIDER (PCP) BY CALLING HER OFTEN TO CHECK IN WITH HER***    ***CALL YOUR UROLOGIST, DR. ESTRELLA, ON MONDAY 4/20, TO FIND OUT ABOUT RESCHEDULING YOUR BLADDER SURGERY***

## 2020-04-17 NOTE — DISCHARGE NOTE PROVIDER - NSDCFUSCHEDAPPT_GEN_ALL_CORE_FT
MICHELLE, Welsh ; 05/20/2020 ; [facility] MICHELLE, Jama[place] ; 05/20/2020 ; [facility]

## 2020-04-17 NOTE — DISCHARGE NOTE PROVIDER - CARE PROVIDER_API CALL
Chet Diaz)  Urology  [address] [place] [address], [address]  [place] [address]  Phone: [phone]  Fax: [phone]  Established Patient  Follow Up Time: 1-3 days

## 2020-04-30 DIAGNOSIS — D49.4 NEOPLASM OF UNSPECIFIED BEHAVIOR OF BLADDER: ICD-10-CM

## 2020-04-30 DIAGNOSIS — J12.89 OTHER VIRAL PNEUMONIA: ICD-10-CM

## 2020-04-30 DIAGNOSIS — U07.1 COVID-19: ICD-10-CM

## 2020-04-30 DIAGNOSIS — M19.90 UNSPECIFIED OSTEOARTHRITIS, UNSPECIFIED SITE: ICD-10-CM

## 2020-05-13 ENCOUNTER — OUTPATIENT (OUTPATIENT)
Dept: OUTPATIENT SERVICES | Facility: HOSPITAL | Age: 60
LOS: 1 days | Discharge: HOME | End: 2020-05-13
Payer: MEDICAID

## 2020-05-13 ENCOUNTER — RESULT REVIEW (OUTPATIENT)
Age: 60
End: 2020-05-13

## 2020-05-13 VITALS
HEART RATE: 76 BPM | TEMPERATURE: 98 F | RESPIRATION RATE: 16 BRPM | OXYGEN SATURATION: 97 % | SYSTOLIC BLOOD PRESSURE: 127 MMHG | WEIGHT: 143.3 LBS | HEIGHT: 63 IN | DIASTOLIC BLOOD PRESSURE: 83 MMHG

## 2020-05-13 DIAGNOSIS — Z01.818 ENCOUNTER FOR OTHER PREPROCEDURAL EXAMINATION: ICD-10-CM

## 2020-05-13 DIAGNOSIS — C67.9 MALIGNANT NEOPLASM OF BLADDER, UNSPECIFIED: ICD-10-CM

## 2020-05-13 DIAGNOSIS — Z90.5 ACQUIRED ABSENCE OF KIDNEY: Chronic | ICD-10-CM

## 2020-05-13 LAB
ALBUMIN SERPL ELPH-MCNC: 4.1 G/DL — SIGNIFICANT CHANGE UP (ref 3.5–5.2)
ALP SERPL-CCNC: 76 U/L — SIGNIFICANT CHANGE UP (ref 30–115)
ALT FLD-CCNC: 23 U/L — SIGNIFICANT CHANGE UP (ref 0–41)
ANION GAP SERPL CALC-SCNC: 9 MMOL/L — SIGNIFICANT CHANGE UP (ref 7–14)
APPEARANCE UR: CLEAR — SIGNIFICANT CHANGE UP
APTT BLD: 38.6 SEC — SIGNIFICANT CHANGE UP (ref 27–39.2)
AST SERPL-CCNC: 26 U/L — SIGNIFICANT CHANGE UP (ref 0–41)
BASOPHILS # BLD AUTO: 0.04 K/UL — SIGNIFICANT CHANGE UP (ref 0–0.2)
BASOPHILS NFR BLD AUTO: 0.7 % — SIGNIFICANT CHANGE UP (ref 0–1)
BILIRUB SERPL-MCNC: 0.6 MG/DL — SIGNIFICANT CHANGE UP (ref 0.2–1.2)
BILIRUB UR-MCNC: NEGATIVE — SIGNIFICANT CHANGE UP
BLD GP AB SCN SERPL QL: SIGNIFICANT CHANGE UP
BUN SERPL-MCNC: 9 MG/DL — LOW (ref 10–20)
CALCIUM SERPL-MCNC: 9.2 MG/DL — SIGNIFICANT CHANGE UP (ref 8.5–10.1)
CHLORIDE SERPL-SCNC: 104 MMOL/L — SIGNIFICANT CHANGE UP (ref 98–110)
CO2 SERPL-SCNC: 25 MMOL/L — SIGNIFICANT CHANGE UP (ref 17–32)
COLOR SPEC: COLORLESS — SIGNIFICANT CHANGE UP
CREAT SERPL-MCNC: 0.7 MG/DL — SIGNIFICANT CHANGE UP (ref 0.7–1.5)
DIFF PNL FLD: NEGATIVE — SIGNIFICANT CHANGE UP
EOSINOPHIL # BLD AUTO: 0.14 K/UL — SIGNIFICANT CHANGE UP (ref 0–0.7)
EOSINOPHIL NFR BLD AUTO: 2.3 % — SIGNIFICANT CHANGE UP (ref 0–8)
GLUCOSE SERPL-MCNC: 93 MG/DL — SIGNIFICANT CHANGE UP (ref 70–99)
GLUCOSE UR QL: NEGATIVE — SIGNIFICANT CHANGE UP
HCT VFR BLD CALC: 47.3 % — SIGNIFICANT CHANGE UP (ref 42–52)
HGB BLD-MCNC: 16.3 G/DL — SIGNIFICANT CHANGE UP (ref 14–18)
IMM GRANULOCYTES NFR BLD AUTO: 0.3 % — SIGNIFICANT CHANGE UP (ref 0.1–0.3)
INR BLD: 1.03 RATIO — SIGNIFICANT CHANGE UP (ref 0.65–1.3)
KETONES UR-MCNC: NEGATIVE — SIGNIFICANT CHANGE UP
LEUKOCYTE ESTERASE UR-ACNC: NEGATIVE — SIGNIFICANT CHANGE UP
LYMPHOCYTES # BLD AUTO: 1.93 K/UL — SIGNIFICANT CHANGE UP (ref 1.2–3.4)
LYMPHOCYTES # BLD AUTO: 32.2 % — SIGNIFICANT CHANGE UP (ref 20.5–51.1)
MCHC RBC-ENTMCNC: 30.7 PG — SIGNIFICANT CHANGE UP (ref 27–31)
MCHC RBC-ENTMCNC: 34.5 G/DL — SIGNIFICANT CHANGE UP (ref 32–37)
MCV RBC AUTO: 89.1 FL — SIGNIFICANT CHANGE UP (ref 80–94)
MONOCYTES # BLD AUTO: 0.57 K/UL — SIGNIFICANT CHANGE UP (ref 0.1–0.6)
MONOCYTES NFR BLD AUTO: 9.5 % — HIGH (ref 1.7–9.3)
NEUTROPHILS # BLD AUTO: 3.29 K/UL — SIGNIFICANT CHANGE UP (ref 1.4–6.5)
NEUTROPHILS NFR BLD AUTO: 55 % — SIGNIFICANT CHANGE UP (ref 42.2–75.2)
NITRITE UR-MCNC: NEGATIVE — SIGNIFICANT CHANGE UP
NRBC # BLD: 0 /100 WBCS — SIGNIFICANT CHANGE UP (ref 0–0)
PH UR: 6.5 — SIGNIFICANT CHANGE UP (ref 5–8)
PLATELET # BLD AUTO: 205 K/UL — SIGNIFICANT CHANGE UP (ref 130–400)
POTASSIUM SERPL-MCNC: 4.1 MMOL/L — SIGNIFICANT CHANGE UP (ref 3.5–5)
POTASSIUM SERPL-SCNC: 4.1 MMOL/L — SIGNIFICANT CHANGE UP (ref 3.5–5)
PROT SERPL-MCNC: 7.4 G/DL — SIGNIFICANT CHANGE UP (ref 6–8)
PROT UR-MCNC: NEGATIVE — SIGNIFICANT CHANGE UP
PROTHROM AB SERPL-ACNC: 11.8 SEC — SIGNIFICANT CHANGE UP (ref 9.95–12.87)
RBC # BLD: 5.31 M/UL — SIGNIFICANT CHANGE UP (ref 4.7–6.1)
RBC # FLD: 13.2 % — SIGNIFICANT CHANGE UP (ref 11.5–14.5)
SODIUM SERPL-SCNC: 138 MMOL/L — SIGNIFICANT CHANGE UP (ref 135–146)
SP GR SPEC: 1 — LOW (ref 1.01–1.02)
UROBILINOGEN FLD QL: SIGNIFICANT CHANGE UP
WBC # BLD: 5.99 K/UL — SIGNIFICANT CHANGE UP (ref 4.8–10.8)
WBC # FLD AUTO: 5.99 K/UL — SIGNIFICANT CHANGE UP (ref 4.8–10.8)

## 2020-05-13 PROCEDURE — 71046 X-RAY EXAM CHEST 2 VIEWS: CPT | Mod: 26

## 2020-05-13 PROCEDURE — 93010 ELECTROCARDIOGRAM REPORT: CPT

## 2020-05-13 RX ORDER — LACTOBACILLUS ACIDOPHILUS 100MM CELL
2 CAPSULE ORAL
Qty: 0 | Refills: 0 | DISCHARGE

## 2020-05-13 RX ORDER — LANOLIN ALCOHOL/MO/W.PET/CERES
1 CREAM (GRAM) TOPICAL
Qty: 0 | Refills: 0 | DISCHARGE

## 2020-05-13 NOTE — H&P PST ADULT - HISTORY OF PRESENT ILLNESS
Pt has h/o bladder cancer. Denies any chest pain, difficulty breathing, SOB, palpitations, dysuria, URI, or any other infections in the last 2 weeks. Pt was tested and treated for COVID-19 in April 2020 but currently denies any symptoms. Exercise tolerance of 2-3 flights of stairs without dyspnea. MARLA reviewed with patient.

## 2020-05-13 NOTE — H&P PST ADULT - VENOUS THROMBOEMBOLISM HISTORY
09/18/18 1230   Pain Assessment   Pain Assessment 0-10   Pain Score 4   Pain Type Surgical pain   Pain Location Shoulder   Restrictions/Precautions   Precautions Aspiration;Bed/chair alarms; Fall Risk;Spinal precautions;Supervision on toilet/commode   ROM Restrictions Yes   Braces or Orthoses C/S Collar   QI: Sit to [address] Provided by [place] No physical assistance   Sit to Stand CARE Score 4   Transfer Bed/Chair/Wheelchair   Limitations Noted In Balance; Coordination;LE Strength;UE Strength   Adaptive Equipment Roller Walker   Findings Pt consistently CS throughout session with use of RW     Bed, Chair, Wheelchair Transfer (FIM) 5 - Patient requires supervision/monitoring   QI: [address] Provided by [place] No physical assistance   Toileting Hygiene CARE Score 4   Toileting   Able to [address] down yes, up yes  Able to Manage Clothing Closures Yes   Manage Hygiene Bladder   Limitations Noted In Balance; Safety;LE Strength   Toileting (FIM) 5 - Patient requires supervision/monitoring   Exercise Tools   Theraputty Pt engaged in theraputty bead retrieval with use of medium soft putty to increase [address] and strength for increased independence with C collar management  Other Exercise Tool 1 Pt completes table top towel glides moving through all planes 3 x 10 each to increase UE ROM and reduce stiffness  Pt reports effectiveness with reducing pain      Cognition   Overall Cognitive Status Impaired   Arousal/Participation Cooperative   Attention Attends with cues to redirect   Orientation Level Oriented X4   Memory Decreased short term memory;Decreased recall of precautions   Following Commands Follows one step commands with increased time or repetition   Activity Tolerance   Activity Tolerance Patient tolerated treatment well   Assessment   Treatment Assessment Pt participated in skilled OT services with focus on functional transfers, UE ROM, and C collar management  Pt issued handout on C collar management and re educated  Pt given demonstration on proper technique for pad change but demos P carryover after demonstration and requires mod/max VC's for completion  Pt states he could have his daughter A him if needed  Will continue to try mass training if appropriate and if not will discuss having daughter come in for training  Pt completes functional mobility and toileting while in stance at  with UE support on RW  Pt will continue to benefit from skilled OT services with focus on standing balance, standing tolerance, activity tolerance, and UE ROM  Prognosis Good   Problem List Decreased strength;Decreased endurance; Impaired balance;Decreased mobility; Decreased coordination;Decreased safety awareness;Orthopedic restrictions   Plan   Treatment/Interventions ADL retraining;Functional transfer training; Therapeutic exercise; Endurance training;Patient/family training;Equipment eval/education; Compensatory technique education   Progress Progressing toward goals   Recommendation   OT Discharge Recommendation Home with family support   OT Therapy Minutes   OT Time In 1230   OT Time Out 1400   OT Total Time (minutes) 90   OT Mode of treatment - Individual (minutes) 90   OT Mode of treatment - Concurrent (minutes) 0   OT Mode of treatment - Group (minutes) 0   OT Mode of treatment - Co-treat (minutes) 0   OT Mode of Teatment - Total time(minutes) 90 minutes   Therapy Time missed   Time missed?  No (0) indicator not present

## 2020-05-13 NOTE — H&P PST ADULT - REASON FOR ADMISSION
61 yo male presents for PAST in preparation for cystoscopy transurethral resection of bladder tumor, medium on 5/20/2020

## 2020-05-13 NOTE — H&P PST ADULT - NSICDXPASTMEDICALHX_GEN_ALL_CORE_FT
PAST MEDICAL HISTORY:  Bladder tumor     History of 2019 novel coronavirus disease (COVID-19)     OA (osteoarthritis) knees

## 2020-05-14 LAB
CULTURE RESULTS: SIGNIFICANT CHANGE UP
SPECIMEN SOURCE: SIGNIFICANT CHANGE UP

## 2020-05-18 ENCOUNTER — OUTPATIENT (OUTPATIENT)
Dept: OUTPATIENT SERVICES | Facility: HOSPITAL | Age: 60
LOS: 1 days | Discharge: HOME | End: 2020-05-18

## 2020-05-18 DIAGNOSIS — Z01.818 ENCOUNTER FOR OTHER PREPROCEDURAL EXAMINATION: ICD-10-CM

## 2020-05-18 DIAGNOSIS — Z11.59 ENCOUNTER FOR SCREENING FOR OTHER VIRAL DISEASES: ICD-10-CM

## 2020-05-18 DIAGNOSIS — Z90.5 ACQUIRED ABSENCE OF KIDNEY: Chronic | ICD-10-CM

## 2020-05-18 PROBLEM — Z86.19 PERSONAL HISTORY OF OTHER INFECTIOUS AND PARASITIC DISEASES: Chronic | Status: ACTIVE | Noted: 2020-05-13

## 2020-05-19 LAB — SARS-COV-2 RNA SPEC QL NAA+PROBE: SIGNIFICANT CHANGE UP

## 2020-05-20 ENCOUNTER — APPOINTMENT (OUTPATIENT)
Dept: UROLOGY | Facility: HOSPITAL | Age: 60
End: 2020-05-20
Payer: MEDICAID

## 2020-05-20 ENCOUNTER — RESULT REVIEW (OUTPATIENT)
Age: 60
End: 2020-05-20

## 2020-05-20 ENCOUNTER — OUTPATIENT (OUTPATIENT)
Dept: OUTPATIENT SERVICES | Facility: HOSPITAL | Age: 60
LOS: 1 days | Discharge: HOME | End: 2020-05-20
Payer: COMMERCIAL

## 2020-05-20 VITALS
OXYGEN SATURATION: 96 % | RESPIRATION RATE: 18 BRPM | DIASTOLIC BLOOD PRESSURE: 72 MMHG | HEART RATE: 86 BPM | TEMPERATURE: 99 F | HEIGHT: 63 IN | WEIGHT: 149.91 LBS | SYSTOLIC BLOOD PRESSURE: 136 MMHG

## 2020-05-20 VITALS — RESPIRATION RATE: 18 BRPM | HEART RATE: 64 BPM | DIASTOLIC BLOOD PRESSURE: 64 MMHG | SYSTOLIC BLOOD PRESSURE: 100 MMHG

## 2020-05-20 DIAGNOSIS — Z90.5 ACQUIRED ABSENCE OF KIDNEY: Chronic | ICD-10-CM

## 2020-05-20 PROCEDURE — 88307 TISSUE EXAM BY PATHOLOGIST: CPT | Mod: 26

## 2020-05-20 PROCEDURE — 52224 CYSTOSCOPY AND TREATMENT: CPT

## 2020-05-20 RX ORDER — ACETAMINOPHEN 500 MG
650 TABLET ORAL ONCE
Refills: 0 | Status: DISCONTINUED | OUTPATIENT
Start: 2020-05-20 | End: 2020-06-04

## 2020-05-20 RX ORDER — HYDROMORPHONE HYDROCHLORIDE 2 MG/ML
0.5 INJECTION INTRAMUSCULAR; INTRAVENOUS; SUBCUTANEOUS
Refills: 0 | Status: DISCONTINUED | OUTPATIENT
Start: 2020-05-20 | End: 2020-05-20

## 2020-05-20 RX ORDER — SODIUM CHLORIDE 9 MG/ML
1000 INJECTION, SOLUTION INTRAVENOUS
Refills: 0 | Status: DISCONTINUED | OUTPATIENT
Start: 2020-05-20 | End: 2020-06-04

## 2020-05-20 RX ORDER — ASCORBIC ACID 60 MG
1 TABLET,CHEWABLE ORAL
Qty: 0 | Refills: 0 | DISCHARGE

## 2020-05-20 RX ORDER — OXYCODONE AND ACETAMINOPHEN 5; 325 MG/1; MG/1
1 TABLET ORAL EVERY 4 HOURS
Refills: 0 | Status: DISCONTINUED | OUTPATIENT
Start: 2020-05-20 | End: 2020-05-20

## 2020-05-20 RX ADMIN — SODIUM CHLORIDE 100 MILLILITER(S): 9 INJECTION, SOLUTION INTRAVENOUS at 08:34

## 2020-05-20 RX ADMIN — OXYCODONE AND ACETAMINOPHEN 1 TABLET(S): 5; 325 TABLET ORAL at 08:36

## 2020-05-22 LAB — SURGICAL PATHOLOGY STUDY: SIGNIFICANT CHANGE UP

## 2020-05-26 DIAGNOSIS — C67.2 MALIGNANT NEOPLASM OF LATERAL WALL OF BLADDER: ICD-10-CM

## 2020-05-26 DIAGNOSIS — M19.90 UNSPECIFIED OSTEOARTHRITIS, UNSPECIFIED SITE: ICD-10-CM

## 2020-05-26 DIAGNOSIS — Z86.19 PERSONAL HISTORY OF OTHER INFECTIOUS AND PARASITIC DISEASES: ICD-10-CM

## 2020-05-26 DIAGNOSIS — D49.4 NEOPLASM OF UNSPECIFIED BEHAVIOR OF BLADDER: ICD-10-CM

## 2020-06-08 ENCOUNTER — APPOINTMENT (OUTPATIENT)
Dept: UROLOGY | Facility: CLINIC | Age: 60
End: 2020-06-08
Payer: MEDICAID

## 2020-06-08 PROCEDURE — 99213 OFFICE O/P EST LOW 20 MIN: CPT

## 2020-06-08 NOTE — ASSESSMENT
[FreeTextEntry1] : 61 yo with solitary right kidney\par \par low grade with focal high grade disease\par \par discussed full the options\par \par - will proceed with 6 instillations of Gemcitabine\par - all questions answered\par - UA and Culture ordered

## 2020-06-08 NOTE — HISTORY OF PRESENT ILLNESS
[FreeTextEntry1] : 61 yo with newly diagnosed bladder cancer\par \par s/p left nephrectomy for non-functional kidney - [place]\par \par s/p TURBT\par path - \par rare foci of high grade in a background of low grade \par  [None] : no symptoms

## 2020-06-08 NOTE — PHYSICAL EXAM
[General Appearance - Well Developed] : well developed [General Appearance - Well Nourished] : well nourished [Normal Appearance] : normal appearance [Well Groomed] : well groomed [General Appearance - In No Acute Distress] : no acute distress [Abdomen Soft] : soft [Abdomen Tenderness] : non-tender [Costovertebral Angle Tenderness] : no ~M costovertebral angle tenderness [Urethral Meatus] : meatus normal [Urinary Bladder Findings] : the bladder was normal on palpation [Scrotum] : the scrotum was normal [Testes Mass (___cm)] : there were no testicular masses [No Prostate Nodules] : no prostate nodules [Edema] : no peripheral edema [] : no respiratory distress [Exaggerated Use Of Accessory Muscles For Inspiration] : no accessory muscle use [Oriented To Time, Place, And Person] : oriented to person, place, and time [Respiration, Rhythm And Depth] : normal respiratory rhythm and effort [Affect] : the affect was normal [Mood] : the mood was normal [Not Anxious] : not anxious [Normal Station and Gait] : the gait and station were normal for the patient's age [No Focal Deficits] : no focal deficits [No Palpable Adenopathy] : no palpable adenopathy

## 2020-06-09 ENCOUNTER — TRANSCRIPTION ENCOUNTER (OUTPATIENT)
Age: 60
End: 2020-06-09

## 2020-06-09 LAB
APPEARANCE: CLEAR
BILIRUBIN URINE: NEGATIVE
BLOOD URINE: NEGATIVE
COLOR: COLORLESS
GLUCOSE QUALITATIVE U: NEGATIVE
KETONES URINE: NEGATIVE
LEUKOCYTE ESTERASE URINE: NEGATIVE
NITRITE URINE: NEGATIVE
PH URINE: 7
PROTEIN URINE: NEGATIVE
SPECIFIC GRAVITY URINE: 1
UROBILINOGEN URINE: NORMAL

## 2020-06-10 ENCOUNTER — TRANSCRIPTION ENCOUNTER (OUTPATIENT)
Age: 60
End: 2020-06-10

## 2020-06-10 ENCOUNTER — LABORATORY RESULT (OUTPATIENT)
Age: 60
End: 2020-06-10

## 2020-06-10 LAB — BACTERIA UR CULT: NORMAL

## 2020-06-12 ENCOUNTER — APPOINTMENT (OUTPATIENT)
Dept: UROLOGY | Facility: CLINIC | Age: 60
End: 2020-06-12
Payer: MEDICAID

## 2020-06-12 ENCOUNTER — LABORATORY RESULT (OUTPATIENT)
Age: 60
End: 2020-06-12

## 2020-06-12 PROCEDURE — 99213 OFFICE O/P EST LOW 20 MIN: CPT | Mod: 95

## 2020-06-12 NOTE — ASSESSMENT
[FreeTextEntry1] : 61 yo with Ta low grade with focal high grade \par \par discussed the pathology in detail\par \par UA and culture reviewed\par \par proceed with Gemcitabine ttmnt\par 6 ttmnts\par full R+B explained\par

## 2020-06-12 NOTE — LETTER BODY
[Consult Letter:] : I had the pleasure of evaluating your patient, [unfilled]. [Dear  ___] : Dear  [unfilled], [Please see my note below.] : Please see my note below. [Sincerely,] : Sincerely, [FreeTextEntry3] : Chet Diaz MD, FACS\par

## 2020-06-12 NOTE — HISTORY OF PRESENT ILLNESS
[Home] : at home, [unfilled] , at the time of the visit. [Medical Office: ([facility])___] : at the medical office located in  [Verbal consent obtained from patient] : the patient, [unfilled] [Family Member] : family member [FreeTextEntry1] : 61 yo with newly diagnosed bladder cancer\par \par s/p left nephrectomy for non-functional kidney - [place]\par \par s/p TURBT\par path - \par rare foci of high grade in a background of low grade \par \par discussing the gemcitabine treatments\par the daughter had several questions regarding the treatment \par

## 2020-06-17 ENCOUNTER — OUTPATIENT (OUTPATIENT)
Dept: OUTPATIENT SERVICES | Facility: HOSPITAL | Age: 60
LOS: 1 days | Discharge: HOME | End: 2020-06-17

## 2020-06-17 ENCOUNTER — APPOINTMENT (OUTPATIENT)
Dept: UROLOGY | Facility: CLINIC | Age: 60
End: 2020-06-17
Payer: MEDICAID

## 2020-06-17 VITALS
SYSTOLIC BLOOD PRESSURE: 141 MMHG | DIASTOLIC BLOOD PRESSURE: 97 MMHG | WEIGHT: 152 LBS | TEMPERATURE: 96.4 F | RESPIRATION RATE: 14 BRPM | HEART RATE: 61 BPM | HEIGHT: 63 IN | BODY MASS INDEX: 26.93 KG/M2

## 2020-06-17 DIAGNOSIS — Z90.5 ACQUIRED ABSENCE OF KIDNEY: Chronic | ICD-10-CM

## 2020-06-17 PROCEDURE — 51720 TREATMENT OF BLADDER LESION: CPT

## 2020-06-17 RX ORDER — GEMCITABINE 38 MG/ML
2000 INJECTION, SOLUTION INTRAVENOUS ONCE
Refills: 0 | Status: DISCONTINUED | OUTPATIENT
Start: 2020-06-17 | End: 2020-09-20

## 2020-06-18 DIAGNOSIS — C67.9 MALIGNANT NEOPLASM OF BLADDER, UNSPECIFIED: ICD-10-CM

## 2020-06-20 NOTE — H&P PST ADULT - VASCULAR
detailed exam
GOAL: pt will improve dynamic standing balcne to good+ to improve ADls and amb in 2wks.

## 2020-06-24 ENCOUNTER — OUTPATIENT (OUTPATIENT)
Dept: OUTPATIENT SERVICES | Facility: HOSPITAL | Age: 60
LOS: 1 days | Discharge: HOME | End: 2020-06-24

## 2020-06-24 ENCOUNTER — APPOINTMENT (OUTPATIENT)
Dept: UROLOGY | Facility: CLINIC | Age: 60
End: 2020-06-24
Payer: MEDICAID

## 2020-06-24 VITALS
BODY MASS INDEX: 26.93 KG/M2 | DIASTOLIC BLOOD PRESSURE: 80 MMHG | SYSTOLIC BLOOD PRESSURE: 119 MMHG | TEMPERATURE: 97.5 F | HEIGHT: 63 IN | HEART RATE: 68 BPM | WEIGHT: 152 LBS | RESPIRATION RATE: 14 BRPM

## 2020-06-24 DIAGNOSIS — Z90.5 ACQUIRED ABSENCE OF KIDNEY: Chronic | ICD-10-CM

## 2020-06-24 DIAGNOSIS — C67.9 MALIGNANT NEOPLASM OF BLADDER, UNSPECIFIED: ICD-10-CM

## 2020-06-24 PROCEDURE — 51720 TREATMENT OF BLADDER LESION: CPT

## 2020-06-24 RX ORDER — GEMCITABINE 38 MG/ML
2000 INJECTION, SOLUTION INTRAVENOUS ONCE
Refills: 0 | Status: DISCONTINUED | OUTPATIENT
Start: 2020-06-24 | End: 2020-09-27

## 2020-06-26 ENCOUNTER — OUTPATIENT (OUTPATIENT)
Dept: OUTPATIENT SERVICES | Facility: HOSPITAL | Age: 60
LOS: 1 days | Discharge: HOME | End: 2020-06-26

## 2020-06-26 ENCOUNTER — LABORATORY RESULT (OUTPATIENT)
Age: 60
End: 2020-06-26

## 2020-06-26 DIAGNOSIS — Z90.5 ACQUIRED ABSENCE OF KIDNEY: Chronic | ICD-10-CM

## 2020-06-26 DIAGNOSIS — Z11.59 ENCOUNTER FOR SCREENING FOR OTHER VIRAL DISEASES: ICD-10-CM

## 2020-07-01 ENCOUNTER — APPOINTMENT (OUTPATIENT)
Dept: UROLOGY | Facility: CLINIC | Age: 60
End: 2020-07-01
Payer: MEDICAID

## 2020-07-01 ENCOUNTER — OUTPATIENT (OUTPATIENT)
Dept: OUTPATIENT SERVICES | Facility: HOSPITAL | Age: 60
LOS: 1 days | Discharge: HOME | End: 2020-07-01

## 2020-07-01 VITALS
HEIGHT: 63 IN | HEART RATE: 70 BPM | SYSTOLIC BLOOD PRESSURE: 105 MMHG | BODY MASS INDEX: 26.93 KG/M2 | WEIGHT: 152 LBS | RESPIRATION RATE: 14 BRPM | DIASTOLIC BLOOD PRESSURE: 76 MMHG | TEMPERATURE: 97.5 F

## 2020-07-01 DIAGNOSIS — Z90.5 ACQUIRED ABSENCE OF KIDNEY: Chronic | ICD-10-CM

## 2020-07-01 DIAGNOSIS — C67.9 MALIGNANT NEOPLASM OF BLADDER, UNSPECIFIED: ICD-10-CM

## 2020-07-01 PROCEDURE — 51720 TREATMENT OF BLADDER LESION: CPT

## 2020-07-01 RX ORDER — GEMCITABINE 38 MG/ML
2000 INJECTION, SOLUTION INTRAVENOUS ONCE
Refills: 0 | Status: DISCONTINUED | OUTPATIENT
Start: 2020-07-01 | End: 2020-10-04

## 2020-07-08 ENCOUNTER — APPOINTMENT (OUTPATIENT)
Dept: UROLOGY | Facility: CLINIC | Age: 60
End: 2020-07-08
Payer: MEDICAID

## 2020-07-08 ENCOUNTER — OUTPATIENT (OUTPATIENT)
Dept: OUTPATIENT SERVICES | Facility: HOSPITAL | Age: 60
LOS: 1 days | Discharge: HOME | End: 2020-07-08

## 2020-07-08 VITALS
TEMPERATURE: 97 F | RESPIRATION RATE: 14 BRPM | DIASTOLIC BLOOD PRESSURE: 76 MMHG | WEIGHT: 152 LBS | BODY MASS INDEX: 26.93 KG/M2 | HEART RATE: 74 BPM | SYSTOLIC BLOOD PRESSURE: 122 MMHG | HEIGHT: 63 IN

## 2020-07-08 DIAGNOSIS — Z90.5 ACQUIRED ABSENCE OF KIDNEY: Chronic | ICD-10-CM

## 2020-07-08 PROCEDURE — 51720 TREATMENT OF BLADDER LESION: CPT

## 2020-07-08 RX ORDER — GEMCITABINE 38 MG/ML
2000 INJECTION, SOLUTION INTRAVENOUS ONCE
Refills: 0 | Status: DISCONTINUED | OUTPATIENT
Start: 2020-07-08 | End: 2020-10-11

## 2020-07-09 DIAGNOSIS — C67.9 MALIGNANT NEOPLASM OF BLADDER, UNSPECIFIED: ICD-10-CM

## 2020-07-10 ENCOUNTER — LABORATORY RESULT (OUTPATIENT)
Age: 60
End: 2020-07-10

## 2020-07-15 ENCOUNTER — OUTPATIENT (OUTPATIENT)
Dept: OUTPATIENT SERVICES | Facility: HOSPITAL | Age: 60
LOS: 1 days | Discharge: HOME | End: 2020-07-15

## 2020-07-15 ENCOUNTER — APPOINTMENT (OUTPATIENT)
Dept: UROLOGY | Facility: CLINIC | Age: 60
End: 2020-07-15
Payer: MEDICAID

## 2020-07-15 VITALS
HEART RATE: 66 BPM | DIASTOLIC BLOOD PRESSURE: 83 MMHG | SYSTOLIC BLOOD PRESSURE: 117 MMHG | BODY MASS INDEX: 26.93 KG/M2 | HEIGHT: 63 IN | WEIGHT: 152 LBS | TEMPERATURE: 98.6 F | RESPIRATION RATE: 14 BRPM

## 2020-07-15 DIAGNOSIS — C67.9 MALIGNANT NEOPLASM OF BLADDER, UNSPECIFIED: ICD-10-CM

## 2020-07-15 DIAGNOSIS — Z90.5 ACQUIRED ABSENCE OF KIDNEY: Chronic | ICD-10-CM

## 2020-07-15 PROCEDURE — 51720 TREATMENT OF BLADDER LESION: CPT

## 2020-07-15 RX ORDER — GEMCITABINE 38 MG/ML
2000 INJECTION, SOLUTION INTRAVENOUS ONCE
Refills: 0 | Status: DISCONTINUED | OUTPATIENT
Start: 2020-07-15 | End: 2020-10-18

## 2020-07-22 ENCOUNTER — OUTPATIENT (OUTPATIENT)
Dept: OUTPATIENT SERVICES | Facility: HOSPITAL | Age: 60
LOS: 1 days | Discharge: HOME | End: 2020-07-22

## 2020-07-22 ENCOUNTER — APPOINTMENT (OUTPATIENT)
Dept: UROLOGY | Facility: CLINIC | Age: 60
End: 2020-07-22
Payer: MEDICAID

## 2020-07-22 VITALS
DIASTOLIC BLOOD PRESSURE: 78 MMHG | SYSTOLIC BLOOD PRESSURE: 121 MMHG | BODY MASS INDEX: 26.93 KG/M2 | HEIGHT: 63 IN | HEART RATE: 72 BPM | RESPIRATION RATE: 14 BRPM | WEIGHT: 152 LBS | TEMPERATURE: 97.9 F

## 2020-07-22 DIAGNOSIS — C67.9 MALIGNANT NEOPLASM OF BLADDER, UNSPECIFIED: ICD-10-CM

## 2020-07-22 DIAGNOSIS — Z90.5 ACQUIRED ABSENCE OF KIDNEY: Chronic | ICD-10-CM

## 2020-07-22 PROCEDURE — 51720 TREATMENT OF BLADDER LESION: CPT

## 2020-07-22 RX ORDER — GEMCITABINE 38 MG/ML
2000 INJECTION, SOLUTION INTRAVENOUS ONCE
Refills: 0 | Status: DISCONTINUED | OUTPATIENT
Start: 2020-07-22 | End: 2020-10-25

## 2020-08-13 LAB
APPEARANCE: CLEAR
BACTERIA UR CULT: NORMAL
BILIRUBIN URINE: NEGATIVE
BLOOD URINE: NEGATIVE
COLOR: NORMAL
GLUCOSE QUALITATIVE U: NEGATIVE
KETONES URINE: NEGATIVE
LEUKOCYTE ESTERASE URINE: NEGATIVE
NITRITE URINE: NEGATIVE
PH URINE: 6.5
PROTEIN URINE: NEGATIVE
SPECIFIC GRAVITY URINE: 1.01
UROBILINOGEN URINE: NORMAL

## 2020-09-10 ENCOUNTER — APPOINTMENT (OUTPATIENT)
Dept: UROLOGY | Facility: CLINIC | Age: 60
End: 2020-09-10
Payer: MEDICAID

## 2020-09-10 PROCEDURE — 52000 CYSTOURETHROSCOPY: CPT

## 2020-12-01 ENCOUNTER — LABORATORY RESULT (OUTPATIENT)
Age: 60
End: 2020-12-01

## 2020-12-03 LAB
PSA FREE FLD-MCNC: 22 %
PSA FREE SERPL-MCNC: 0.31 NG/ML
PSA SERPL-MCNC: 1.38 NG/ML
URINE CYTOLOGY: NORMAL

## 2020-12-10 ENCOUNTER — APPOINTMENT (OUTPATIENT)
Dept: UROLOGY | Facility: CLINIC | Age: 60
End: 2020-12-10
Payer: MEDICAID

## 2020-12-10 VITALS
TEMPERATURE: 98.9 F | HEIGHT: 63 IN | HEART RATE: 72 BPM | BODY MASS INDEX: 26.93 KG/M2 | DIASTOLIC BLOOD PRESSURE: 76 MMHG | WEIGHT: 152 LBS | SYSTOLIC BLOOD PRESSURE: 125 MMHG

## 2020-12-10 PROCEDURE — 99072 ADDL SUPL MATRL&STAF TM PHE: CPT

## 2020-12-10 PROCEDURE — 52000 CYSTOURETHROSCOPY: CPT

## 2021-01-12 ENCOUNTER — EMERGENCY (EMERGENCY)
Facility: HOSPITAL | Age: 61
LOS: 0 days | Discharge: HOME | End: 2021-01-12
Attending: EMERGENCY MEDICINE | Admitting: EMERGENCY MEDICINE
Payer: COMMERCIAL

## 2021-01-12 VITALS
TEMPERATURE: 99 F | OXYGEN SATURATION: 96 % | SYSTOLIC BLOOD PRESSURE: 136 MMHG | HEIGHT: 63 IN | HEART RATE: 82 BPM | DIASTOLIC BLOOD PRESSURE: 73 MMHG | RESPIRATION RATE: 18 BRPM

## 2021-01-12 DIAGNOSIS — Z85.51 PERSONAL HISTORY OF MALIGNANT NEOPLASM OF BLADDER: ICD-10-CM

## 2021-01-12 DIAGNOSIS — R10.9 UNSPECIFIED ABDOMINAL PAIN: ICD-10-CM

## 2021-01-12 DIAGNOSIS — R10.13 EPIGASTRIC PAIN: ICD-10-CM

## 2021-01-12 DIAGNOSIS — R35.0 FREQUENCY OF MICTURITION: ICD-10-CM

## 2021-01-12 DIAGNOSIS — Z90.5 ACQUIRED ABSENCE OF KIDNEY: Chronic | ICD-10-CM

## 2021-01-12 LAB
ALBUMIN SERPL ELPH-MCNC: 4 G/DL — SIGNIFICANT CHANGE UP (ref 3.5–5.2)
ALP SERPL-CCNC: 67 U/L — SIGNIFICANT CHANGE UP (ref 30–115)
ALT FLD-CCNC: 13 U/L — SIGNIFICANT CHANGE UP (ref 0–41)
ANION GAP SERPL CALC-SCNC: 7 MMOL/L — SIGNIFICANT CHANGE UP (ref 7–14)
APPEARANCE UR: CLEAR — SIGNIFICANT CHANGE UP
AST SERPL-CCNC: 25 U/L — SIGNIFICANT CHANGE UP (ref 0–41)
BASOPHILS # BLD AUTO: 0.03 K/UL — SIGNIFICANT CHANGE UP (ref 0–0.2)
BASOPHILS NFR BLD AUTO: 0.6 % — SIGNIFICANT CHANGE UP (ref 0–1)
BILIRUB SERPL-MCNC: 0.7 MG/DL — SIGNIFICANT CHANGE UP (ref 0.2–1.2)
BILIRUB UR-MCNC: NEGATIVE — SIGNIFICANT CHANGE UP
BUN SERPL-MCNC: 7 MG/DL — LOW (ref 10–20)
CALCIUM SERPL-MCNC: 8.8 MG/DL — SIGNIFICANT CHANGE UP (ref 8.5–10.1)
CHLORIDE SERPL-SCNC: 102 MMOL/L — SIGNIFICANT CHANGE UP (ref 98–110)
CO2 SERPL-SCNC: 27 MMOL/L — SIGNIFICANT CHANGE UP (ref 17–32)
COLOR SPEC: COLORLESS — SIGNIFICANT CHANGE UP
CREAT SERPL-MCNC: 0.9 MG/DL — SIGNIFICANT CHANGE UP (ref 0.7–1.5)
DIFF PNL FLD: NEGATIVE — SIGNIFICANT CHANGE UP
EOSINOPHIL # BLD AUTO: 0.18 K/UL — SIGNIFICANT CHANGE UP (ref 0–0.7)
EOSINOPHIL NFR BLD AUTO: 3.8 % — SIGNIFICANT CHANGE UP (ref 0–8)
GLUCOSE SERPL-MCNC: 137 MG/DL — HIGH (ref 70–99)
GLUCOSE UR QL: NEGATIVE — SIGNIFICANT CHANGE UP
HCT VFR BLD CALC: 43.9 % — SIGNIFICANT CHANGE UP (ref 42–52)
HGB BLD-MCNC: 15.3 G/DL — SIGNIFICANT CHANGE UP (ref 14–18)
IMM GRANULOCYTES NFR BLD AUTO: 0.2 % — SIGNIFICANT CHANGE UP (ref 0.1–0.3)
KETONES UR-MCNC: NEGATIVE — SIGNIFICANT CHANGE UP
LEUKOCYTE ESTERASE UR-ACNC: NEGATIVE — SIGNIFICANT CHANGE UP
LIDOCAIN IGE QN: 53 U/L — SIGNIFICANT CHANGE UP (ref 7–60)
LYMPHOCYTES # BLD AUTO: 1.02 K/UL — LOW (ref 1.2–3.4)
LYMPHOCYTES # BLD AUTO: 21.8 % — SIGNIFICANT CHANGE UP (ref 20.5–51.1)
MCHC RBC-ENTMCNC: 30.5 PG — SIGNIFICANT CHANGE UP (ref 27–31)
MCHC RBC-ENTMCNC: 34.9 G/DL — SIGNIFICANT CHANGE UP (ref 32–37)
MCV RBC AUTO: 87.5 FL — SIGNIFICANT CHANGE UP (ref 80–94)
MONOCYTES # BLD AUTO: 0.42 K/UL — SIGNIFICANT CHANGE UP (ref 0.1–0.6)
MONOCYTES NFR BLD AUTO: 9 % — SIGNIFICANT CHANGE UP (ref 1.7–9.3)
NEUTROPHILS # BLD AUTO: 3.02 K/UL — SIGNIFICANT CHANGE UP (ref 1.4–6.5)
NEUTROPHILS NFR BLD AUTO: 64.6 % — SIGNIFICANT CHANGE UP (ref 42.2–75.2)
NITRITE UR-MCNC: NEGATIVE — SIGNIFICANT CHANGE UP
NRBC # BLD: 0 /100 WBCS — SIGNIFICANT CHANGE UP (ref 0–0)
PH UR: 7 — SIGNIFICANT CHANGE UP (ref 5–8)
PLATELET # BLD AUTO: 200 K/UL — SIGNIFICANT CHANGE UP (ref 130–400)
POTASSIUM SERPL-MCNC: 4.1 MMOL/L — SIGNIFICANT CHANGE UP (ref 3.5–5)
POTASSIUM SERPL-SCNC: 4.1 MMOL/L — SIGNIFICANT CHANGE UP (ref 3.5–5)
PROT SERPL-MCNC: 6.3 G/DL — SIGNIFICANT CHANGE UP (ref 6–8)
PROT UR-MCNC: NEGATIVE — SIGNIFICANT CHANGE UP
RBC # BLD: 5.02 M/UL — SIGNIFICANT CHANGE UP (ref 4.7–6.1)
RBC # FLD: 12.5 % — SIGNIFICANT CHANGE UP (ref 11.5–14.5)
SODIUM SERPL-SCNC: 136 MMOL/L — SIGNIFICANT CHANGE UP (ref 135–146)
SP GR SPEC: 1.01 — LOW (ref 1.01–1.03)
TROPONIN T SERPL-MCNC: <0.01 NG/ML — SIGNIFICANT CHANGE UP
UROBILINOGEN FLD QL: SIGNIFICANT CHANGE UP
WBC # BLD: 4.68 K/UL — LOW (ref 4.8–10.8)
WBC # FLD AUTO: 4.68 K/UL — LOW (ref 4.8–10.8)

## 2021-01-12 PROCEDURE — 74177 CT ABD & PELVIS W/CONTRAST: CPT | Mod: 26

## 2021-01-12 PROCEDURE — 99285 EMERGENCY DEPT VISIT HI MDM: CPT

## 2021-01-12 PROCEDURE — 93010 ELECTROCARDIOGRAM REPORT: CPT

## 2021-01-12 RX ORDER — SODIUM CHLORIDE 9 MG/ML
1000 INJECTION INTRAMUSCULAR; INTRAVENOUS; SUBCUTANEOUS ONCE
Refills: 0 | Status: COMPLETED | OUTPATIENT
Start: 2021-01-12 | End: 2021-01-12

## 2021-01-12 RX ORDER — FAMOTIDINE 10 MG/ML
1 INJECTION INTRAVENOUS
Qty: 14 | Refills: 0
Start: 2021-01-12 | End: 2021-01-18

## 2021-01-12 RX ORDER — FAMOTIDINE 10 MG/ML
20 INJECTION INTRAVENOUS ONCE
Refills: 0 | Status: COMPLETED | OUTPATIENT
Start: 2021-01-12 | End: 2021-01-12

## 2021-01-12 RX ADMIN — SODIUM CHLORIDE 1000 MILLILITER(S): 9 INJECTION INTRAMUSCULAR; INTRAVENOUS; SUBCUTANEOUS at 14:43

## 2021-01-12 RX ADMIN — FAMOTIDINE 20 MILLIGRAM(S): 10 INJECTION INTRAVENOUS at 14:43

## 2021-01-12 RX ADMIN — Medication 30 MILLILITER(S): at 14:43

## 2021-01-12 NOTE — ED ADULT TRIAGE NOTE - MEANS OF ARRIVAL
Called and spoke to patient  He stated that he does not need labs ordered  What he wanted was that Dr Annelle Hatchet had told him back in Jan that he may want a labs repeated but he did not remember what labs the doctor wanted and he doctor also wanted MRI repeated  Jesse Chavez advised I will speak to Dr Annelle Hatchet and get back to him     ---Dr Annelle Hatchet,    According to the info below on Jan 2020:  -his IGF-1 level was very mildly elevated at 196 (normal <194)  -his testosterone is mildly low at 221 (normal >264)     I would like to repeat the IGF-1 level to make sure it is actually normal  Ordered in EMR  If the repeat IGF-1 level comes back normal then will repeat all the labs in 6 months before next appt      Do you want him to just have the IGF-1 done and the MRI? ambulatory

## 2021-01-12 NOTE — ED PROVIDER NOTE - PHYSICAL EXAMINATION
CONSTITUTIONAL: Well-developed; well-nourished; in no acute distress.   SKIN: warm, dry  HEAD: Normocephalic; atraumatic.  EYES: no conjunctival injection. PERRL.   ENT: No nasal discharge; airway clear.  NECK: Supple; non tender.  CARD: S1, S2 normal; no murmurs, gallops, or rubs. Regular rate and rhythm.   RESP: No wheezes, rales or rhonchi.  ABD: soft, epigastric tenderness to palpation with voluntary rebound  EXT: Normal ROM.  No clubbing, cyanosis or edema.   LYMPH: No acute cervical adenopathy.  NEURO: Alert, oriented, grossly unremarkable  PSYCH: Cooperative, appropriate.

## 2021-01-12 NOTE — ED ADULT NURSE NOTE - OBJECTIVE STATEMENT
Pt presented with c/o abd pain/burning sensation x6mo. Also endorses blood on paper when wiping after having a BM. Denies n/v/diarrhea/fever/sob. Alert and oriented x3.

## 2021-01-12 NOTE — ED PROVIDER NOTE - CARE PROVIDER_API CALL
Jose Sebastian)  Gastroenterology; Internal Medicine  [address] [place] [address]  Phone: [phone]  Fax: [phone]  Follow Up Time:

## 2021-01-12 NOTE — ED ADULT NURSE NOTE - PMH
Bladder tumor    History of 2019 novel coronavirus disease (COVID-19)    OA (osteoarthritis)  knees

## 2021-01-12 NOTE — ED ADULT NURSE NOTE - NSFALLRSKASSESSTYPE_ED_ALL_ED
Here for hormone therapy injection, no complaints at this time, Injection given as ordered, tolerated well, no report of pain prior to or after injection. Return to clinic as scheduled.     Site - LB    Testosterone 50 mg  Depo Estradiol 15 mg    Clinic Supplied Medication   Initial (On Arrival)

## 2021-01-12 NOTE — ED PROVIDER NOTE - OBJECTIVE STATEMENT
Patient is 59 y/o male with medical hx of Osteoarthritis, hx left nephrectomy, bladder tumor s/p resection 5/2020 presenting with abdominal pain x6 months. Epigastric. Burning sensation. No radiation. +mucus in stools. 5-6 loose stools a day. Nausea. No chest pain/SOB. No f/c. No urinary symptoms. Pt noticed blood with wiping after BM today. 2 bms today which is less than usual. No NSAIDs, steroids, alcohol, or tobacco.

## 2021-01-12 NOTE — ED ADULT TRIAGE NOTE - PATIENT ON (OXYGEN DELIVERY METHOD)
Render In Strict Bullet Format?: No Otc Regimen: Daily moisturizer with 30spf sunscreen is recommended Detail Level: Zone room air

## 2021-01-12 NOTE — ED PROVIDER NOTE - PATIENT PORTAL LINK FT
You can access the FollowMyHealth Patient Portal offered by [facility] by registering at the following website: [email or web address]. By joining [employer]’s FollowMyHealth portal, you will also be able to view your health information using other applications (apps) compatible with our system.

## 2021-01-12 NOTE — ED PROVIDER NOTE - PROGRESS NOTE DETAILS
labs ok, ua neg, ct abd - neg for acute abd pathology, + bibasilar GGO.  pt with no resp symptoms, no cough, sob, + normal WOB.  covid swab sent, pt told of results, to d/c home, pt told to self-isolate, given covid d/c instructions, told to return to for shortness of breath, cp, worsening abd pain or for any other new/concerning symptoms.  pt also to f/u with GI for continued outpt eval of abd pain.  pt understands and agrees with plan.

## 2021-01-12 NOTE — ED PROVIDER NOTE - ATTENDING CONTRIBUTION TO CARE
61 y/o male with h/o bladder CA s/p resection 5/2020, s/p L nephrectomy 2/2 trauma as a child, in ER with c/o abdominal pain.  Pt states he's been having GI issues since last August - intermittent burning epigastric pain, increased frequency of bm's, ~ 6/day, at times mucousy.  has been seen by GI and had cultures.  pt states pain has been a little worse for the past few days, not having any mucous with bm's now, but noted a small amount of blood when wiping.  no n/v.  no urinary symptoms.  no f/c.  no cp/sob, no ha/syncope/near syncope.    PE - nad, nc/at, eomi, perrl, op - clear, cta b/l, no w/r/r, rrr, abd - soft, mild mid abdomina tenderness, no guarding/rebound, nabs, rectal - no blood, from x 4, A&O x 3, no focal neuro deficits.  -check labs, ct abd, re-eval.

## 2021-01-12 NOTE — ED PROVIDER NOTE - NS ED ROS FT
Eyes:  No visual changes, eye pain or discharge.  ENMT:  No hearing changes, pain, no sore throat or runny nose, no difficulty swallowing  Cardiac:  No chest pain, SOB or edema. No chest pain with exertion.  Respiratory:  No cough or respiratory distress. No hemoptysis. No history of asthma or RAD.  GI:  see HPI  :  No dysuria, frequency or burning.  MS:  No myalgia, muscle weakness, joint pain or back pain.  Neuro:  No headache or weakness.  No LOC.  Skin:  No skin rash.   Endocrine: No history of thyroid disease or diabetes.

## 2021-01-13 LAB — SARS-COV-2 RNA SPEC QL NAA+PROBE: DETECTED

## 2021-01-14 LAB
CULTURE RESULTS: SIGNIFICANT CHANGE UP
SPECIMEN SOURCE: SIGNIFICANT CHANGE UP

## 2021-02-05 NOTE — ED PROVIDER NOTE - NSFOLLOWUPINSTRUCTIONS_ED_ALL_ED_FT
Yes...
Abdominal Pain, Adult  Abdominal pain can be caused by many things. Often, abdominal pain is not serious and it gets better with no treatment or by being treated at home. However, sometimes abdominal pain is serious. Your health care provider will do a medical history and a physical exam to try to determine the cause of your abdominal pain.    Follow these instructions at home:  Take over-the-counter and prescription medicines only as told by your health care provider. Do not take a laxative unless told by your health care provider.  ImageDrink enough fluid to keep your urine clear or pale yellow.  Watch your condition for any changes.  Keep all follow-up visits as told by your health care provider. This is important.  Contact a health care provider if:  Your abdominal pain changes or gets worse.  You are not hungry or you lose weight without trying.  You are constipated or have diarrhea for more than 2–3 days.  You have pain when you urinate or have a bowel movement.  Your abdominal pain wakes you up at night.  Your pain gets worse with meals, after eating, or with certain foods.  You are throwing up and cannot keep anything down.  You have a fever.  Get help right away if:  Your pain does not go away as soon as your health care provider told you to expect.  You cannot stop throwing up.  Your pain is only in areas of the abdomen, such as the right side or the left lower portion of the abdomen.  You have bloody or black stools, or stools that look like tar.  You have severe pain, cramping, or bloating in your abdomen.  You have signs of dehydration, such as:    Dark urine, very little urine, or no urine.  Cracked lips.  Dry mouth.  Sunken eyes.  Sleepiness.  Weakness.    This information is not intended to replace advice given to you by your health care provider. Make sure you discuss any questions you have with your health care provider

## 2021-02-19 ENCOUNTER — APPOINTMENT (OUTPATIENT)
Dept: GASTROENTEROLOGY | Facility: CLINIC | Age: 61
End: 2021-02-19

## 2021-03-09 ENCOUNTER — LABORATORY RESULT (OUTPATIENT)
Age: 61
End: 2021-03-09

## 2021-03-18 ENCOUNTER — APPOINTMENT (OUTPATIENT)
Dept: UROLOGY | Facility: CLINIC | Age: 61
End: 2021-03-18
Payer: MEDICAID

## 2021-03-18 LAB — URINE CYTOLOGY: NORMAL

## 2021-03-18 PROCEDURE — 99072 ADDL SUPL MATRL&STAF TM PHE: CPT

## 2021-03-18 PROCEDURE — 52000 CYSTOURETHROSCOPY: CPT

## 2021-09-14 ENCOUNTER — LABORATORY RESULT (OUTPATIENT)
Age: 61
End: 2021-09-14

## 2021-09-20 ENCOUNTER — APPOINTMENT (OUTPATIENT)
Dept: UROLOGY | Facility: CLINIC | Age: 61
End: 2021-09-20
Payer: COMMERCIAL

## 2021-09-20 PROCEDURE — 52000 CYSTOURETHROSCOPY: CPT

## 2022-01-25 ENCOUNTER — NON-APPOINTMENT (OUTPATIENT)
Age: 62
End: 2022-01-25

## 2022-01-27 ENCOUNTER — APPOINTMENT (OUTPATIENT)
Dept: RHEUMATOLOGY | Facility: CLINIC | Age: 62
End: 2022-01-27
Payer: COMMERCIAL

## 2022-01-27 ENCOUNTER — NON-APPOINTMENT (OUTPATIENT)
Age: 62
End: 2022-01-27

## 2022-01-27 ENCOUNTER — RESULT REVIEW (OUTPATIENT)
Age: 62
End: 2022-01-27

## 2022-01-27 ENCOUNTER — OUTPATIENT (OUTPATIENT)
Dept: OUTPATIENT SERVICES | Facility: HOSPITAL | Age: 62
LOS: 1 days | Discharge: HOME | End: 2022-01-27
Payer: COMMERCIAL

## 2022-01-27 VITALS
HEIGHT: 63 IN | TEMPERATURE: 98 F | WEIGHT: 150 LBS | SYSTOLIC BLOOD PRESSURE: 124 MMHG | HEART RATE: 77 BPM | OXYGEN SATURATION: 97 % | BODY MASS INDEX: 26.58 KG/M2 | DIASTOLIC BLOOD PRESSURE: 80 MMHG

## 2022-01-27 DIAGNOSIS — Z90.5 ACQUIRED ABSENCE OF KIDNEY: Chronic | ICD-10-CM

## 2022-01-27 DIAGNOSIS — M25.50 PAIN IN UNSPECIFIED JOINT: ICD-10-CM

## 2022-01-27 PROCEDURE — 73562 X-RAY EXAM OF KNEE 3: CPT | Mod: 26,50

## 2022-01-27 PROCEDURE — 73130 X-RAY EXAM OF HAND: CPT | Mod: 26,50

## 2022-01-27 PROCEDURE — 72050 X-RAY EXAM NECK SPINE 4/5VWS: CPT | Mod: 26

## 2022-01-27 PROCEDURE — 99205 OFFICE O/P NEW HI 60 MIN: CPT

## 2022-01-27 PROCEDURE — 73522 X-RAY EXAM HIPS BI 3-4 VIEWS: CPT | Mod: 26

## 2022-01-27 RX ORDER — TAMSULOSIN HYDROCHLORIDE 0.4 MG/1
0.4 CAPSULE ORAL
Qty: 60 | Refills: 5 | Status: DISCONTINUED | COMMUNITY
Start: 2020-09-10 | End: 2022-01-27

## 2022-01-28 ENCOUNTER — LABORATORY RESULT (OUTPATIENT)
Age: 62
End: 2022-01-28

## 2022-01-28 LAB
CK SERPL-CCNC: 86 U/L
ERYTHROCYTE [SEDIMENTATION RATE] IN BLOOD BY WESTERGREN METHOD: 2 MM/HR
TSH SERPL-ACNC: 1.06 UIU/ML
URATE SERPL-MCNC: 5.2 MG/DL

## 2022-02-07 LAB
25(OH)D3 SERPL-MCNC: 18 NG/ML
ALDOLASE SERPL-CCNC: 3.2 U/L
ANA SER IF-ACNC: NEGATIVE
C3 SERPL-MCNC: 147 MG/DL
C4 SERPL-MCNC: 24 MG/DL
CCP AB SER IA-ACNC: <8 UNITS
CRP SERPL-MCNC: <3 MG/L
DSDNA AB SER-ACNC: <12 IU/ML
ENA RNP AB SER IA-ACNC: <0.2 AL
ENA SM AB SER IA-ACNC: <0.2 AL
ENA SS-A AB SER IA-ACNC: <0.2 AL
ENA SS-B AB SER IA-ACNC: <0.2 AL
HBV CORE IGG+IGM SER QL: NONREACTIVE
HBV CORE IGM SER QL: NONREACTIVE
HBV SURFACE AB SER QL: NONREACTIVE
HBV SURFACE AG SER QL: NONREACTIVE
HCV AB SER QL: NONREACTIVE
HCV S/CO RATIO: 0.09 S/CO
HLA-B27 RELATED AG QL: NEGATIVE
RF+CCP IGG SER-IMP: NEGATIVE
RHEUMATOID FACT SER QL: <10 IU/ML

## 2022-02-07 NOTE — ASSESSMENT
[FreeTextEntry1] : 61 year old male with a history of bladder cancer, colitis, GERD who presents for evaluation of arthralgias and myalgias.\par \par 1. Arthralgias\par -No clear inflammatory component. Check serologies for connective tissue disease, x-ray's of hips, knees, hands. Start Voltaren gel. Will discuss PT/OT pending results at follow up\par \par 2. Myalgias\par -No weakness on exam or neurologic symptoms. Check muscle enzymes and inflammatory markers\par \par 3. Lateral epicondylitis\par -Elbow brace\par \par F/u 2 weeks

## 2022-02-07 NOTE — PHYSICAL EXAM
[General Appearance - Alert] : alert [General Appearance - In No Acute Distress] : in no acute distress [Sclera] : the sclera and conjunctiva were normal [PERRL With Normal Accommodation] : pupils were equal in size, round, and reactive to light [Extraocular Movements] : extraocular movements were intact [Outer Ear] : the ears and nose were normal in appearance [Oropharynx] : the oropharynx was normal [Neck Appearance] : the appearance of the neck was normal [Neck Cervical Mass (___cm)] : no neck mass was observed [Jugular Venous Distention Increased] : there was no jugular-venous distention [Thyroid Diffuse Enlargement] : the thyroid was not enlarged [Thyroid Nodule] : there were no palpable thyroid nodules [Auscultation Breath Sounds / Voice Sounds] : lungs were clear to auscultation bilaterally [Heart Rate And Rhythm] : heart rate was normal and rhythm regular [Heart Sounds] : normal S1 and S2 [Heart Sounds Gallop] : no gallops [Murmurs] : no murmurs [Heart Sounds Pericardial Friction Rub] : no pericardial rub [Bowel Sounds] : normal bowel sounds [Abdomen Soft] : soft [Abdomen Tenderness] : non-tender [Abdomen Mass (___ Cm)] : no abdominal mass palpated [Abnormal Walk] : normal gait [Nail Clubbing] : no clubbing  or cyanosis of the fingernails [Musculoskeletal - Swelling] : no joint swelling seen [Motor Tone] : muscle strength and tone were normal [Skin Color & Pigmentation] : normal skin color and pigmentation [Skin Turgor] : normal skin turgor [] : no rash [Deep Tendon Reflexes (DTR)] : deep tendon reflexes were 2+ and symmetric [Sensation] : the sensory exam was normal to light touch and pinprick [No Focal Deficits] : no focal deficits [FreeTextEntry1] : Muscle strength 5/5 in proximal and distal lower extremities.

## 2022-02-07 NOTE — HISTORY OF PRESENT ILLNESS
[FreeTextEntry1] : 61 year old male who presents for evaluation of joint pains.\par \par Patient reports pain in muscles in his legs, and arthralgias in hands, knees, hips when he goes up and down the stairs not on ground level. He has no Am stiffness or joint swelling. He has bilateral elbow pains. Nothing makes his joint pains better or worse. He has been working as a  the past 1 year and was a /delivery prior to that for 20 years. He has a rash on his back. He tried Ibuprofen in the past after 1 week he didn't feel better so he stopped. He has dry eyes. Denies oral or nasal ulcers, epistaxis, muscle weakness, dysphagia. He has a history of bladder cancer s/p gemcitabine that resulted in colitis and he takes mesalamine. Also was diagnosed with GERD and had work up with GI that included EGD and started on omeprazole.

## 2022-02-09 ENCOUNTER — APPOINTMENT (OUTPATIENT)
Dept: RHEUMATOLOGY | Facility: CLINIC | Age: 62
End: 2022-02-09
Payer: COMMERCIAL

## 2022-02-09 VITALS
HEART RATE: 74 BPM | SYSTOLIC BLOOD PRESSURE: 118 MMHG | TEMPERATURE: 98 F | BODY MASS INDEX: 26.58 KG/M2 | HEIGHT: 63 IN | WEIGHT: 150 LBS | DIASTOLIC BLOOD PRESSURE: 80 MMHG | OXYGEN SATURATION: 97 %

## 2022-02-09 DIAGNOSIS — E55.9 VITAMIN D DEFICIENCY, UNSPECIFIED: ICD-10-CM

## 2022-02-09 PROCEDURE — 99214 OFFICE O/P EST MOD 30 MIN: CPT

## 2022-02-09 RX ORDER — DICLOFENAC SODIUM 1% 10 MG/G
1 GEL TOPICAL
Qty: 1 | Refills: 5 | Status: ACTIVE | COMMUNITY
Start: 2022-02-09 | End: 1900-01-01

## 2022-02-09 RX ORDER — CHOLECALCIFEROL (VITAMIN D3) 50 MCG
50 MCG TABLET ORAL
Qty: 90 | Refills: 0 | Status: ACTIVE | COMMUNITY
Start: 2022-02-09 | End: 1900-01-01

## 2022-02-09 NOTE — HISTORY OF PRESENT ILLNESS
[FreeTextEntry1] : 61 year old male who presents for follow evaluation of joint pains.\par \par 2/9/22:\par Patient here for follow up for results. He reports bilateral shoulder pains, bilateral thigh pains radiating to knees when going up the stairs. He has left foot and ankle pain. He reports his arthritic symptoms get worse with activity and better with rest. He hasn't tried OTC voltaren gel. He takes icy hot\par \par Initial visit:\par Patient reports pain in muscles in his legs, and arthralgias in hands, knees, hips when he goes up and down the stairs not on ground level. He has no Am stiffness or joint swelling. He has bilateral elbow pains. Nothing makes his joint pains better or worse. He has been working as a  the past 1 year and was a /delivery prior to that for 20 years. He has a rash on his back. He tried Ibuprofen in the past after 1 week he didn't feel better so he stopped. He has dry eyes. Denies oral or nasal ulcers, epistaxis, muscle weakness, dysphagia. He has a history of bladder cancer s/p gemcitabine that resulted in colitis and he takes mesalamine. Also was diagnosed with GERD and had work up with GI that included EGD and started on omeprazole.

## 2022-02-09 NOTE — ASSESSMENT
[FreeTextEntry1] : 61 year old male with a history of bladder cancer, colitis, GERD who presents for evaluation of osteoarthritis\par \par 1. Osteoarthritis \par -Multiple sites. Send to PT for OA knees, OT for hands, start Voltaren gel. Start vitamin D. Tylenol for pain relief\par \par 2. Myalgias\par -CPK normal \par \par 3. Lateral epicondylitis\par -Elbow brace\par \par F/u 6 months if all is well

## 2022-03-21 ENCOUNTER — APPOINTMENT (OUTPATIENT)
Dept: UROLOGY | Facility: CLINIC | Age: 62
End: 2022-03-21
Payer: COMMERCIAL

## 2022-03-21 VITALS — BODY MASS INDEX: 26.58 KG/M2 | WEIGHT: 150 LBS | HEIGHT: 63 IN

## 2022-03-21 LAB
BILIRUB UR QL STRIP: NORMAL
COLLECTION METHOD: NORMAL
GLUCOSE UR-MCNC: NORMAL
HCG UR QL: 0.2 EU/DL
HGB UR QL STRIP.AUTO: NORMAL
KETONES UR-MCNC: NORMAL
LEUKOCYTE ESTERASE UR QL STRIP: NORMAL
NITRITE UR QL STRIP: NORMAL
PH UR STRIP: 6.5
PROT UR STRIP-MCNC: NORMAL
SP GR UR STRIP: 1

## 2022-03-21 PROCEDURE — 52000 CYSTOURETHROSCOPY: CPT

## 2022-03-21 PROCEDURE — 81003 URINALYSIS AUTO W/O SCOPE: CPT | Mod: QW

## 2022-04-01 NOTE — ASU DISCHARGE PLAN (ADULT/PEDIATRIC) - "IF YOU OR YOUR GUARDIAN/FAMILY IS A SMOKER, IT IS IMPORTANT FOR YOUR HEALTH TO STOP SMOKING. PLEASE BE AWARE THAT SECOND HAND SMOKE IS ALSO HARMFUL."
Spoke with patients home health nurse, she states patient looks really good. Patient is at home after reconnection of colostomy. Nurse requesting lab orders for patient. Nurse is to draw CBC, CMP, MAG and Phos. Nurse verbalized understanding.  Electronically signed by Karen Padilla RN on 4/1/2022 at 9:41 AM
Statement Selected

## 2022-09-12 LAB
APPEARANCE: CLEAR
BILIRUBIN URINE: NEGATIVE
BLOOD URINE: NEGATIVE
COLOR: NORMAL
GLUCOSE QUALITATIVE U: NEGATIVE
KETONES URINE: NEGATIVE
LEUKOCYTE ESTERASE URINE: NEGATIVE
NITRITE URINE: NEGATIVE
PH URINE: 6.5
PROTEIN URINE: NEGATIVE
SPECIFIC GRAVITY URINE: 1.02
UROBILINOGEN URINE: NORMAL

## 2022-09-13 LAB
PSA FREE FLD-MCNC: 16 %
PSA FREE SERPL-MCNC: 0.32 NG/ML
PSA SERPL-MCNC: 2.01 NG/ML
URINE CYTOLOGY: NORMAL

## 2022-09-14 LAB — BACTERIA UR CULT: NORMAL

## 2022-09-19 ENCOUNTER — APPOINTMENT (OUTPATIENT)
Dept: UROLOGY | Facility: CLINIC | Age: 62
End: 2022-09-19

## 2022-09-19 PROCEDURE — 52000 CYSTOURETHROSCOPY: CPT

## 2022-09-20 ENCOUNTER — OUTPATIENT (OUTPATIENT)
Dept: OUTPATIENT SERVICES | Facility: HOSPITAL | Age: 62
LOS: 1 days | Discharge: HOME | End: 2022-09-20

## 2022-09-20 ENCOUNTER — RESULT REVIEW (OUTPATIENT)
Age: 62
End: 2022-09-20

## 2022-09-20 DIAGNOSIS — Z90.5 ACQUIRED ABSENCE OF KIDNEY: Chronic | ICD-10-CM

## 2022-09-20 DIAGNOSIS — C67.9 MALIGNANT NEOPLASM OF BLADDER, UNSPECIFIED: ICD-10-CM

## 2022-09-20 PROCEDURE — 76770 US EXAM ABDO BACK WALL COMP: CPT | Mod: 26

## 2022-09-21 ENCOUNTER — APPOINTMENT (OUTPATIENT)
Dept: NEUROSURGERY | Facility: CLINIC | Age: 62
End: 2022-09-21

## 2022-09-21 VITALS — HEIGHT: 63 IN | BODY MASS INDEX: 26.58 KG/M2 | WEIGHT: 150 LBS

## 2022-09-21 DIAGNOSIS — Z83.3 FAMILY HISTORY OF DIABETES MELLITUS: ICD-10-CM

## 2022-09-21 DIAGNOSIS — M54.2 CERVICALGIA: ICD-10-CM

## 2022-09-21 PROCEDURE — 99202 OFFICE O/P NEW SF 15 MIN: CPT

## 2022-09-22 PROBLEM — M54.2 NECK PAIN: Status: ACTIVE | Noted: 2022-09-22

## 2022-09-22 NOTE — HISTORY OF PRESENT ILLNESS
[de-identified] : Patient reports severe Neck pain radiating to B/L UE including shoulders. And lower back pain radiating to to B/L LE. No PT or pain mgt.\par \par MRI no significant nerve impingement

## 2022-09-22 NOTE — ASSESSMENT
[FreeTextEntry1] : There is no indication for neurosurgical intervention.\par Recommend PT, continue f/u with rheumatology\par return as needed

## 2022-11-17 ENCOUNTER — OUTPATIENT (OUTPATIENT)
Dept: OUTPATIENT SERVICES | Facility: HOSPITAL | Age: 62
LOS: 1 days | Discharge: HOME | End: 2022-11-17

## 2022-11-17 DIAGNOSIS — Z90.5 ACQUIRED ABSENCE OF KIDNEY: Chronic | ICD-10-CM

## 2022-11-17 DIAGNOSIS — R10.9 UNSPECIFIED ABDOMINAL PAIN: ICD-10-CM

## 2022-11-17 PROCEDURE — 76700 US EXAM ABDOM COMPLETE: CPT | Mod: 26

## 2023-01-09 ENCOUNTER — EMERGENCY (EMERGENCY)
Facility: HOSPITAL | Age: 63
LOS: 0 days | Discharge: HOME | End: 2023-01-09
Attending: EMERGENCY MEDICINE | Admitting: EMERGENCY MEDICINE
Payer: COMMERCIAL

## 2023-01-09 VITALS
OXYGEN SATURATION: 98 % | WEIGHT: 160.06 LBS | TEMPERATURE: 97 F | RESPIRATION RATE: 17 BRPM | DIASTOLIC BLOOD PRESSURE: 84 MMHG | HEART RATE: 85 BPM | SYSTOLIC BLOOD PRESSURE: 148 MMHG

## 2023-01-09 DIAGNOSIS — Z86.16 PERSONAL HISTORY OF COVID-19: ICD-10-CM

## 2023-01-09 DIAGNOSIS — J02.9 ACUTE PHARYNGITIS, UNSPECIFIED: ICD-10-CM

## 2023-01-09 DIAGNOSIS — U07.1 COVID-19: ICD-10-CM

## 2023-01-09 DIAGNOSIS — Z90.5 ACQUIRED ABSENCE OF KIDNEY: Chronic | ICD-10-CM

## 2023-01-09 DIAGNOSIS — Z90.5 ACQUIRED ABSENCE OF KIDNEY: ICD-10-CM

## 2023-01-09 DIAGNOSIS — R05.1 ACUTE COUGH: ICD-10-CM

## 2023-01-09 DIAGNOSIS — R09.81 NASAL CONGESTION: ICD-10-CM

## 2023-01-09 DIAGNOSIS — Z85.51 PERSONAL HISTORY OF MALIGNANT NEOPLASM OF BLADDER: ICD-10-CM

## 2023-01-09 DIAGNOSIS — M19.90 UNSPECIFIED OSTEOARTHRITIS, UNSPECIFIED SITE: ICD-10-CM

## 2023-01-09 PROCEDURE — 71045 X-RAY EXAM CHEST 1 VIEW: CPT | Mod: 26

## 2023-01-09 PROCEDURE — 99284 EMERGENCY DEPT VISIT MOD MDM: CPT

## 2023-01-09 PROCEDURE — 71250 CT THORAX DX C-: CPT | Mod: 26,MA

## 2023-01-09 NOTE — ED PROVIDER NOTE - CLINICAL SUMMARY MEDICAL DECISION MAKING FREE TEXT BOX
COVID positive.  CT Chest shows b/l interstitial infiltrates.  No acute ED intervention.  No hypoxia.  Supportive care.  DC home.  Strict return instructions discussed.

## 2023-01-09 NOTE — ED PROVIDER NOTE - ATTENDING SHARED VISIT SELECTORS
History/Exam/Medical Decision Making [facility] Counseling: I discussed with the patient the risks of valacyclovir including but not limited to kidney damage, nausea, vomiting and severe allergy.  The patient understands that if the infection seems to be worsening or is not improving, they are to call.

## 2023-01-09 NOTE — ED PROVIDER NOTE - OBJECTIVE STATEMENT
62-year-old male with history of bladder CA status post resection, left nephrectomy, osteoarthritis presents to the ED complaining of sore throat, cough, nasal congestion, some shortness of breath and hemoptysis this morning.  Patient states had COVID positive home test on Saturday.  Patient denies any chest pain, fever, chills, leg pain or leg swelling.

## 2023-01-09 NOTE — ED PROVIDER NOTE - PATIENT PORTAL LINK FT
You can access the FollowMyHealth Patient Portal offered by [facility] by registering at the following website: [email or web address]. By joining [employer]’s FollowMyHealth portal, you will also be able to view your health information using other applications (apps) compatible with our system.

## 2023-01-09 NOTE — ED PROVIDER NOTE - NSFOLLOWUPINSTRUCTIONS_ED_ALL_ED_FT
Novel Coronavirus (COVID-19)  The Facts  What is a coronavirus?  Coronaviruses are a large family of viruses that cause illnesses ranging from the common cold  to more severe diseases such as Middle East Respiratory Syndrome (MERS) and Severe Acute  Respiratory Syndrome (SARS).  What is Novel Coronavirus (COVID-19)?  COVID-19 is a new strain of Coronavirus that has not been previously identified in humans. COVID-19  was identified in [place], [place], [place] in December 2019 (COVID-19). COVID-19 has  since been identified outside of [place], in a growing number of countries internationally, including  the [place].  Where can I find the most recent information about COVID-19?  The Centers for Disease Control and Prevention (CDC) is closely monitoring the outbreak caused by the  COVID-19. For the latest information about COVID- 19, visit the CDC website at  https://www.cdc.gov/coronavirus/index.html  How are coronaviruses spread?  Coronaviruses can be transmitted from person-to- person, usually after close contact with an infected person,  for example, in a household, workplace, or healthcare setting via droplets that become airborne after a cough  or sneeze by an affected person. These droplets can then infect a nearby person. It is likely transmission also  occurs by touching recently contaminated surfaces.  What are the symptoms of coronavirus infection?  It depends on the virus, but common signs include fever and/or respiratory symptoms such as  cough and shortness of breath. In more severe cases, infection can cause pneumonia, severe acute  respiratory syndrome, kidney failure and even death. Fortunately, most cases of COVID-19 have an  illness no different than the influenza “flu”. With a majority of these patients having mild symptoms  and overall mortality which appears to be not much different than the flu.  Is there a treatment for a COVID-19?  There is no specific treatment for disease caused by COVID-19. However, many of the symptoms can  be treated based on the patient’s clinical condition. Supportive care for infected persons can be highly  effective.  What can I do to protect myself?  Washing your hands, covering your cough, and disinfecting surfaces are the best precautionary  measures. It is also advisable to avoid close contact with anyone showing symptoms of respiratory  illness such as coughing and sneezing. Those with symptoms should wear a surgical mask when  around others.  What can I do to protect those around me?  If you have been identified as someone who may be infected with COVID-19, we recommend you  follow the self-isolation procedures outlined below to protect those around you and limit the spread  of this virus.   March 3, 2020  Recommendations for Patients Advised to Self-Isolate  for Possible COVID-19 Exposure  We recommend the below precautionary steps from now until 14 days from when you  returned from your travel or date of your last known possible contact:  - Do not go to work, school, or public areas. Avoid using public transportation, ride-sharing, or  taxis.  - As much as possible, separate yourself from other people in your home. If you can, you should  stay in a room and away from other people in your home. Also, you should use a separate  bathroom, if available.  - Wear the supplied mask whenever you are around other people.  - If you have a non-urgent medical appointment, please reschedule for a later date. If the  appointment is urgent, please call the healthcare provider and tell them that you are on selfisolation for possible COVID-19. This will help the healthcare provider’s office take steps to keep  other people from getting infected or exposed. If you can reschedule routine appointments, do  so.  - Wash your hands often with soap and water for at least 15 to 20 seconds or clean your hands  with an alcohol-based hand  that contains 60 to 95% alcohol, covering all surfaces of  your hands and rubbing them together until they feel dry. Soap and water should be used  preferentially if hands are visibly dirty.  - Cover your mouth and nose with a tissue when you cough or sneeze. Throw used tissues in a  lined trash can; immediately wash your hands.  - Avoid touching your eyes, nose, and mouth with your hands.  - Avoid sharing personal household items. You should not share dishes, drinking glasses, cups,  eating utensils, towels, or bedding with other people or pets in your home. After using these  items, they should be washed thoroughly with soap and water.  - Clean and disinfect all “high-touch” surfaces every day. High touch surfaces include counters,  tabletops, doorknobs, light switches, remote controls, bathroom fixtures, toilets, phones,  keyboards, tablets, and bedside tables. Also, clean any surfaces that may have blood, stool, or  body fluids on them.       If you develop worsening symptoms:  - If you develop worsening symptoms, such as severe shortness of breath, please call [phone] option #9. They will assist you in determining your next steps.  During your time on self-isolation do the following:  - Work from home if you are able to so.  - Limit social isolation by talking with friends and family on the phone or with face-time  - Talk with friends and relatives who don’t live with you about supporting each other if one  household has to be quarantined. For example, agree to drop groceries or other supplies at the  front door.  - Exercise and spend time outdoors away from others if able to do so.      What should I do now?  If you are well enough to be discharged home and are not in a high risk group to have  contracted the COVID-10, you should care for yourself at home exactly like you would if you  have Influenza “flu”. Follow all the standard guidelines about washing your hands, covering  your cough, etc.  You should return to the Emergency Department if you develop worse symptoms, trouble  breathing, chest pain, and/or a fever that doesn’t improve with over the counter  acetaminophen or ibuprofen.

## 2023-09-11 ENCOUNTER — LABORATORY RESULT (OUTPATIENT)
Age: 63
End: 2023-09-11

## 2023-10-09 ENCOUNTER — APPOINTMENT (OUTPATIENT)
Dept: UROLOGY | Facility: CLINIC | Age: 63
End: 2023-10-09
Payer: COMMERCIAL

## 2023-10-09 DIAGNOSIS — C67.9 MALIGNANT NEOPLASM OF BLADDER, UNSPECIFIED: ICD-10-CM

## 2023-10-09 PROCEDURE — 52000 CYSTOURETHROSCOPY: CPT

## 2023-10-09 RX ORDER — CEFUROXIME AXETIL 250 MG/1
250 TABLET ORAL
Qty: 6 | Refills: 0 | Status: ACTIVE | COMMUNITY
Start: 2023-10-09 | End: 1900-01-01

## 2023-10-16 PROBLEM — C67.9 BLADDER CANCER: Status: ACTIVE | Noted: 2020-02-20

## 2023-11-29 ENCOUNTER — OUTPATIENT (OUTPATIENT)
Dept: OUTPATIENT SERVICES | Facility: HOSPITAL | Age: 63
LOS: 1 days | End: 2023-11-29
Payer: COMMERCIAL

## 2023-11-29 DIAGNOSIS — Z90.5 ACQUIRED ABSENCE OF KIDNEY: Chronic | ICD-10-CM

## 2023-11-29 DIAGNOSIS — M17.0 BILATERAL PRIMARY OSTEOARTHRITIS OF KNEE: ICD-10-CM

## 2023-11-29 PROCEDURE — 73562 X-RAY EXAM OF KNEE 3: CPT | Mod: 26,50

## 2023-11-29 PROCEDURE — 73562 X-RAY EXAM OF KNEE 3: CPT | Mod: 50

## 2023-11-30 DIAGNOSIS — M17.0 BILATERAL PRIMARY OSTEOARTHRITIS OF KNEE: ICD-10-CM

## 2024-01-23 NOTE — H&P PST ADULT - NSWEIGHTCALCTOOLDRUG_GEN_A_CORE
Please notify pt his PET scan did not show overt evidence of sarcoid in his heart. More in depth discussion during 1/25/24 appmnt with Dr. Soliz.      Thanks!    - Migue
 used

## 2024-04-01 ENCOUNTER — APPOINTMENT (OUTPATIENT)
Dept: ORTHOPEDIC SURGERY | Facility: CLINIC | Age: 64
End: 2024-04-01
Payer: MEDICAID

## 2024-04-01 VITALS — BODY MASS INDEX: 26.58 KG/M2 | HEIGHT: 63 IN | WEIGHT: 150 LBS

## 2024-04-01 PROCEDURE — 72100 X-RAY EXAM L-S SPINE 2/3 VWS: CPT

## 2024-04-01 PROCEDURE — 99203 OFFICE O/P NEW LOW 30 MIN: CPT

## 2024-04-01 NOTE — IMAGING
[de-identified] : Pleasant slender easy to examine neck mild limited motion with spasm lumbar spine tight dorsolumbar fascia and hamstrings negative straight leg bilaterally both hands mild arthritic deformities  Knees mild swelling some quad atrophy good motion no extensor deficits X-rays lumbar spine today mild-moderate discogenic change  Knee x-rays 1/27/2022 bilateral knee arthritis MRI C-spine 8/18/2022 multilevel disc disease

## 2024-04-01 NOTE — ASSESSMENT
[FreeTextEntry1] : X-rays showed mild lumbar arthritis back brace is fine we discussed going tofor some therapy  saw no indication for any advanced diagnostics at this time  we also discussed the possibility of a cortisone injection to the knee I will see him in a couple months

## 2024-04-01 NOTE — HISTORY OF PRESENT ILLNESS
[de-identified] : 63-year-old gentleman referred by his internist Dr. Garcia for left knee pain has seen her rheumatologist in the past has had neck and back complaints pain in the hands has been experiencing some swelling in both hands and in both knees had x-rays both knees 1/27/2022 showing some mild arthritis He is a retired  weight 150 pain at rest 3 with activity 5 history of bladder cancer 2020 did have a nephrectomy 2003 takes just a little bit of Tylenol reports that wearing the back brace helps the knee pain Had a cervical MRI done

## 2024-07-01 ENCOUNTER — APPOINTMENT (OUTPATIENT)
Dept: ORTHOPEDIC SURGERY | Facility: CLINIC | Age: 64
End: 2024-07-01
Payer: MEDICAID

## 2024-07-01 DIAGNOSIS — M51.9 UNSPECIFIED THORACIC, THORACOLUMBAR AND LUMBOSACRAL INTERVERTEBRAL DISC DISORDER: ICD-10-CM

## 2024-07-01 DIAGNOSIS — M25.50 PAIN IN UNSPECIFIED JOINT: ICD-10-CM

## 2024-07-01 DIAGNOSIS — M19.041 PRIMARY OSTEOARTHRITIS, RIGHT HAND: ICD-10-CM

## 2024-07-01 DIAGNOSIS — M19.042 PRIMARY OSTEOARTHRITIS, RIGHT HAND: ICD-10-CM

## 2024-07-01 DIAGNOSIS — M17.0 BILATERAL PRIMARY OSTEOARTHRITIS OF KNEE: ICD-10-CM

## 2024-07-01 PROCEDURE — 99213 OFFICE O/P EST LOW 20 MIN: CPT

## 2024-07-01 PROCEDURE — 73130 X-RAY EXAM OF HAND: CPT | Mod: 50

## 2024-07-02 PROBLEM — M51.9 LUMBAR DISC DISEASE: Status: ACTIVE | Noted: 2024-04-01

## 2024-07-02 PROBLEM — M19.041 PRIMARY OSTEOARTHRITIS OF BOTH HANDS: Status: ACTIVE | Noted: 2022-02-09

## 2024-07-02 PROBLEM — M17.0 PRIMARY OSTEOARTHRITIS OF BOTH KNEES: Status: ACTIVE | Noted: 2022-02-09

## 2024-07-02 PROBLEM — M25.50 MULTIPLE JOINT PAIN: Status: ACTIVE | Noted: 2022-01-27

## 2024-07-11 ENCOUNTER — OUTPATIENT (OUTPATIENT)
Dept: OUTPATIENT SERVICES | Facility: HOSPITAL | Age: 64
LOS: 1 days | End: 2024-07-11
Payer: MEDICAID

## 2024-07-11 DIAGNOSIS — Z00.8 ENCOUNTER FOR OTHER GENERAL EXAMINATION: ICD-10-CM

## 2024-07-11 DIAGNOSIS — R10.9 UNSPECIFIED ABDOMINAL PAIN: ICD-10-CM

## 2024-07-11 DIAGNOSIS — Z90.5 ACQUIRED ABSENCE OF KIDNEY: Chronic | ICD-10-CM

## 2024-07-11 PROCEDURE — 76700 US EXAM ABDOM COMPLETE: CPT | Mod: 26

## 2024-07-11 PROCEDURE — 76700 US EXAM ABDOM COMPLETE: CPT

## 2024-07-12 DIAGNOSIS — R10.9 UNSPECIFIED ABDOMINAL PAIN: ICD-10-CM

## 2024-07-19 NOTE — ASU PATIENT PROFILE, ADULT - ABILITY TO HEAR (WITH HEARING AID OR HEARING APPLIANCE IF NORMALLY USED):
Pt called stating she had question/ concerns about BiPAP machine. I advise pt to make appointment to discuss concerns. Pt refused and stated she was returning her BiPAP machine to [facility].    Adequate: hears normal conversation without difficulty

## 2024-10-01 ENCOUNTER — APPOINTMENT (OUTPATIENT)
Dept: ORTHOPEDIC SURGERY | Facility: CLINIC | Age: 64
End: 2024-10-01

## 2024-10-08 ENCOUNTER — LABORATORY RESULT (OUTPATIENT)
Age: 64
End: 2024-10-08

## 2024-10-14 ENCOUNTER — APPOINTMENT (OUTPATIENT)
Dept: UROLOGY | Facility: CLINIC | Age: 64
End: 2024-10-14
Payer: MEDICAID

## 2024-10-14 DIAGNOSIS — C67.9 MALIGNANT NEOPLASM OF BLADDER, UNSPECIFIED: ICD-10-CM

## 2024-10-14 LAB
BILIRUB UR QL STRIP: NORMAL
COLLECTION METHOD: NORMAL
GLUCOSE UR-MCNC: NORMAL
HCG UR QL: 0.2 EU/DL
HGB UR QL STRIP.AUTO: NORMAL
KETONES UR-MCNC: NORMAL
LEUKOCYTE ESTERASE UR QL STRIP: NORMAL
NITRITE UR QL STRIP: NORMAL
PH UR STRIP: 6
PROT UR STRIP-MCNC: NORMAL
SP GR UR STRIP: 1.02

## 2024-10-14 PROCEDURE — 52000 CYSTOURETHROSCOPY: CPT

## 2024-10-14 PROCEDURE — 81003 URINALYSIS AUTO W/O SCOPE: CPT | Mod: QW

## 2025-01-15 ENCOUNTER — APPOINTMENT (OUTPATIENT)
Dept: CARDIOLOGY | Facility: CLINIC | Age: 65
End: 2025-01-15
Payer: MEDICAID

## 2025-01-15 VITALS — HEART RATE: 66 BPM | DIASTOLIC BLOOD PRESSURE: 70 MMHG | SYSTOLIC BLOOD PRESSURE: 110 MMHG

## 2025-01-15 VITALS — HEIGHT: 63 IN | WEIGHT: 156 LBS | BODY MASS INDEX: 27.64 KG/M2

## 2025-01-15 DIAGNOSIS — K51.90 ULCERATIVE COLITIS, UNSPECIFIED, W/OUT COMPLICATIONS: ICD-10-CM

## 2025-01-15 DIAGNOSIS — Z87.891 PERSONAL HISTORY OF NICOTINE DEPENDENCE: ICD-10-CM

## 2025-01-15 DIAGNOSIS — R07.9 CHEST PAIN, UNSPECIFIED: ICD-10-CM

## 2025-01-15 DIAGNOSIS — E78.5 HYPERLIPIDEMIA, UNSPECIFIED: ICD-10-CM

## 2025-01-15 DIAGNOSIS — C67.9 MALIGNANT NEOPLASM OF BLADDER, UNSPECIFIED: ICD-10-CM

## 2025-01-15 PROCEDURE — 93000 ELECTROCARDIOGRAM COMPLETE: CPT

## 2025-01-15 PROCEDURE — 99204 OFFICE O/P NEW MOD 45 MIN: CPT | Mod: 25

## 2025-01-15 RX ORDER — CHOLECALCIFEROL (VITAMIN D3) 25 MCG
25 TABLET ORAL
Refills: 0 | Status: ACTIVE | COMMUNITY

## 2025-01-15 RX ORDER — MESALAMINE 1.2 G/1
1.2 TABLET, DELAYED RELEASE ORAL
Refills: 0 | Status: ACTIVE | COMMUNITY

## 2025-01-15 RX ORDER — FOLIC ACID 20 MG
CAPSULE ORAL
Refills: 0 | Status: ACTIVE | COMMUNITY

## 2025-03-26 ENCOUNTER — APPOINTMENT (OUTPATIENT)
Dept: CARDIOLOGY | Facility: CLINIC | Age: 65
End: 2025-03-26
Payer: MEDICAID

## 2025-03-26 DIAGNOSIS — R07.9 CHEST PAIN, UNSPECIFIED: ICD-10-CM

## 2025-03-26 PROCEDURE — 93320 DOPPLER ECHO COMPLETE: CPT

## 2025-03-26 PROCEDURE — 93325 DOPPLER ECHO COLOR FLOW MAPG: CPT

## 2025-03-26 PROCEDURE — 93351 STRESS TTE COMPLETE: CPT

## 2025-04-13 LAB
APPEARANCE: CLEAR
BACTERIA UR CULT: NORMAL
BILIRUBIN URINE: NEGATIVE
BLOOD URINE: NEGATIVE
COLOR: YELLOW
GLUCOSE QUALITATIVE U: NEGATIVE MG/DL
KETONES URINE: ABNORMAL MG/DL
LEUKOCYTE ESTERASE URINE: NEGATIVE
NITRITE URINE: NEGATIVE
PH URINE: 6
PROTEIN URINE: NEGATIVE MG/DL
PSA FREE FLD-MCNC: 18 %
PSA FREE SERPL-MCNC: 0.48 NG/ML
PSA SERPL-MCNC: 2.63 NG/ML
SPECIFIC GRAVITY URINE: 1.02
URINE CYTOLOGY: NORMAL
UROBILINOGEN URINE: 0.2 MG/DL

## 2025-04-14 ENCOUNTER — APPOINTMENT (OUTPATIENT)
Dept: UROLOGY | Facility: CLINIC | Age: 65
End: 2025-04-14
Payer: MEDICAID

## 2025-04-14 DIAGNOSIS — C67.9 MALIGNANT NEOPLASM OF BLADDER, UNSPECIFIED: ICD-10-CM

## 2025-04-14 PROCEDURE — 52000 CYSTOURETHROSCOPY: CPT

## 2025-04-14 PROCEDURE — 81003 URINALYSIS AUTO W/O SCOPE: CPT | Mod: QW

## 2025-04-17 LAB
BILIRUB UR QL STRIP: NORMAL
COLLECTION METHOD: NORMAL
GLUCOSE UR-MCNC: NORMAL
HCG UR QL: 0.2 EU/DL
HGB UR QL STRIP.AUTO: NORMAL
KETONES UR-MCNC: NORMAL
LEUKOCYTE ESTERASE UR QL STRIP: NORMAL
NITRITE UR QL STRIP: NORMAL
PH UR STRIP: 6.5
PROT UR STRIP-MCNC: NORMAL
SP GR UR STRIP: 1.01

## 2025-05-28 ENCOUNTER — OUTPATIENT (OUTPATIENT)
Dept: OUTPATIENT SERVICES | Facility: HOSPITAL | Age: 65
LOS: 1 days | End: 2025-05-28
Payer: MEDICAID

## 2025-05-28 DIAGNOSIS — M25.512 PAIN IN LEFT SHOULDER: ICD-10-CM

## 2025-05-28 DIAGNOSIS — Z90.5 ACQUIRED ABSENCE OF KIDNEY: Chronic | ICD-10-CM

## 2025-05-28 PROCEDURE — 73030 X-RAY EXAM OF SHOULDER: CPT | Mod: LT

## 2025-05-28 PROCEDURE — 73030 X-RAY EXAM OF SHOULDER: CPT | Mod: 26,LT

## 2025-05-29 ENCOUNTER — APPOINTMENT (OUTPATIENT)
Dept: CARDIOLOGY | Facility: CLINIC | Age: 65
End: 2025-05-29
Payer: MEDICAID

## 2025-05-29 ENCOUNTER — NON-APPOINTMENT (OUTPATIENT)
Age: 65
End: 2025-05-29

## 2025-05-29 VITALS
BODY MASS INDEX: 28.35 KG/M2 | DIASTOLIC BLOOD PRESSURE: 80 MMHG | HEIGHT: 63 IN | HEART RATE: 65 BPM | WEIGHT: 160 LBS | SYSTOLIC BLOOD PRESSURE: 124 MMHG

## 2025-05-29 DIAGNOSIS — M54.2 CERVICALGIA: ICD-10-CM

## 2025-05-29 DIAGNOSIS — K51.90 ULCERATIVE COLITIS, UNSPECIFIED, W/OUT COMPLICATIONS: ICD-10-CM

## 2025-05-29 DIAGNOSIS — M25.512 PAIN IN LEFT SHOULDER: ICD-10-CM

## 2025-05-29 DIAGNOSIS — R07.9 CHEST PAIN, UNSPECIFIED: ICD-10-CM

## 2025-05-29 DIAGNOSIS — E55.9 VITAMIN D DEFICIENCY, UNSPECIFIED: ICD-10-CM

## 2025-05-29 PROCEDURE — 99214 OFFICE O/P EST MOD 30 MIN: CPT | Mod: 25

## 2025-05-29 PROCEDURE — 93000 ELECTROCARDIOGRAM COMPLETE: CPT

## 2025-05-29 RX ORDER — UBIDECARENONE 200 MG
CAPSULE ORAL
Refills: 0 | Status: ACTIVE | COMMUNITY